# Patient Record
Sex: FEMALE | Race: BLACK OR AFRICAN AMERICAN | NOT HISPANIC OR LATINO | Employment: OTHER | URBAN - METROPOLITAN AREA
[De-identification: names, ages, dates, MRNs, and addresses within clinical notes are randomized per-mention and may not be internally consistent; named-entity substitution may affect disease eponyms.]

---

## 2017-03-07 PROCEDURE — 99284 EMERGENCY DEPT VISIT MOD MDM: CPT | Mod: ,,, | Performed by: EMERGENCY MEDICINE

## 2017-03-07 PROCEDURE — 99284 EMERGENCY DEPT VISIT MOD MDM: CPT | Mod: 25

## 2017-03-07 PROCEDURE — 93010 ELECTROCARDIOGRAM REPORT: CPT | Mod: ,,, | Performed by: INTERNAL MEDICINE

## 2017-03-07 PROCEDURE — 93005 ELECTROCARDIOGRAM TRACING: CPT

## 2017-03-07 PROCEDURE — 96374 THER/PROPH/DIAG INJ IV PUSH: CPT

## 2017-03-08 ENCOUNTER — HOSPITAL ENCOUNTER (EMERGENCY)
Facility: HOSPITAL | Age: 52
Discharge: HOME | End: 2017-03-08
Attending: EMERGENCY MEDICINE
Payer: MEDICAID

## 2017-03-08 VITALS
WEIGHT: 260 LBS | HEART RATE: 97 BPM | RESPIRATION RATE: 16 BRPM | HEIGHT: 59 IN | OXYGEN SATURATION: 100 % | SYSTOLIC BLOOD PRESSURE: 136 MMHG | BODY MASS INDEX: 52.41 KG/M2 | DIASTOLIC BLOOD PRESSURE: 68 MMHG | TEMPERATURE: 98 F

## 2017-03-08 DIAGNOSIS — R07.9 ACUTE CHEST PAIN: ICD-10-CM

## 2017-03-08 DIAGNOSIS — R06.00 DYSPNEA, UNSPECIFIED TYPE: ICD-10-CM

## 2017-03-08 DIAGNOSIS — R07.9 CHEST PAIN, UNSPECIFIED TYPE: Primary | ICD-10-CM

## 2017-03-08 LAB
ALBUMIN SERPL BCP-MCNC: 3.5 G/DL
ALP SERPL-CCNC: 97 U/L
ALT SERPL W/O P-5'-P-CCNC: 8 U/L
ANION GAP SERPL CALC-SCNC: 10 MMOL/L
AST SERPL-CCNC: 12 U/L
BASOPHILS # BLD AUTO: 0.02 K/UL
BASOPHILS NFR BLD: 0.3 %
BILIRUB SERPL-MCNC: 0.2 MG/DL
BNP SERPL-MCNC: 14 PG/ML
BUN SERPL-MCNC: 11 MG/DL
CALCIUM SERPL-MCNC: 9.7 MG/DL
CHLORIDE SERPL-SCNC: 107 MMOL/L
CO2 SERPL-SCNC: 25 MMOL/L
CREAT SERPL-MCNC: 0.9 MG/DL
D DIMER PPP IA.FEU-MCNC: 0.35 MG/L FEU
DIFFERENTIAL METHOD: ABNORMAL
EOSINOPHIL # BLD AUTO: 0.1 K/UL
EOSINOPHIL NFR BLD: 0.8 %
ERYTHROCYTE [DISTWIDTH] IN BLOOD BY AUTOMATED COUNT: 15.8 %
EST. GFR  (AFRICAN AMERICAN): >60 ML/MIN/1.73 M^2
EST. GFR  (NON AFRICAN AMERICAN): >60 ML/MIN/1.73 M^2
GLUCOSE SERPL-MCNC: 192 MG/DL
HCT VFR BLD AUTO: 33.1 %
HGB BLD-MCNC: 10.8 G/DL
INR PPP: 0.9
LYMPHOCYTES # BLD AUTO: 1.4 K/UL
LYMPHOCYTES NFR BLD: 23.7 %
MCH RBC QN AUTO: 25 PG
MCHC RBC AUTO-ENTMCNC: 32.6 %
MCV RBC AUTO: 77 FL
MONOCYTES # BLD AUTO: 0.2 K/UL
MONOCYTES NFR BLD: 3.5 %
NEUTROPHILS # BLD AUTO: 4.3 K/UL
NEUTROPHILS NFR BLD: 71.5 %
PLATELET # BLD AUTO: 341 K/UL
PMV BLD AUTO: 9.4 FL
POTASSIUM SERPL-SCNC: 3.5 MMOL/L
PROT SERPL-MCNC: 7.4 G/DL
PROTHROMBIN TIME: 10 SEC
RBC # BLD AUTO: 4.32 M/UL
SODIUM SERPL-SCNC: 142 MMOL/L
TROPONIN I SERPL DL<=0.01 NG/ML-MCNC: 0.01 NG/ML
TROPONIN I SERPL DL<=0.01 NG/ML-MCNC: 0.02 NG/ML
TROPONIN I SERPL DL<=0.01 NG/ML-MCNC: <0.006 NG/ML
TSH SERPL DL<=0.005 MIU/L-ACNC: 1.64 UIU/ML
WBC # BLD AUTO: 5.96 K/UL

## 2017-03-08 PROCEDURE — 63600175 PHARM REV CODE 636 W HCPCS: Performed by: EMERGENCY MEDICINE

## 2017-03-08 PROCEDURE — 93010 ELECTROCARDIOGRAM REPORT: CPT | Mod: ,,, | Performed by: INTERNAL MEDICINE

## 2017-03-08 PROCEDURE — 80053 COMPREHEN METABOLIC PANEL: CPT

## 2017-03-08 PROCEDURE — 84443 ASSAY THYROID STIM HORMONE: CPT

## 2017-03-08 PROCEDURE — 85025 COMPLETE CBC W/AUTO DIFF WBC: CPT

## 2017-03-08 PROCEDURE — 85379 FIBRIN DEGRADATION QUANT: CPT

## 2017-03-08 PROCEDURE — 25000003 PHARM REV CODE 250: Performed by: EMERGENCY MEDICINE

## 2017-03-08 PROCEDURE — 84484 ASSAY OF TROPONIN QUANT: CPT

## 2017-03-08 PROCEDURE — 83880 ASSAY OF NATRIURETIC PEPTIDE: CPT

## 2017-03-08 PROCEDURE — 94761 N-INVAS EAR/PLS OXIMETRY MLT: CPT

## 2017-03-08 PROCEDURE — 85610 PROTHROMBIN TIME: CPT

## 2017-03-08 PROCEDURE — 84484 ASSAY OF TROPONIN QUANT: CPT | Mod: 91

## 2017-03-08 RX ORDER — ASPIRIN 325 MG
325 TABLET ORAL
Status: COMPLETED | OUTPATIENT
Start: 2017-03-08 | End: 2017-03-08

## 2017-03-08 RX ORDER — ONDANSETRON 2 MG/ML
8 INJECTION INTRAMUSCULAR; INTRAVENOUS
Status: COMPLETED | OUTPATIENT
Start: 2017-03-08 | End: 2017-03-08

## 2017-03-08 RX ADMIN — ASPIRIN 325 MG ORAL TABLET 325 MG: 325 PILL ORAL at 03:03

## 2017-03-08 RX ADMIN — SODIUM CHLORIDE 1000 ML: 0.9 INJECTION, SOLUTION INTRAVENOUS at 04:03

## 2017-03-08 RX ADMIN — ONDANSETRON 8 MG: 2 INJECTION INTRAMUSCULAR; INTRAVENOUS at 03:03

## 2017-03-08 NOTE — CONSULTS
Cardiology Initial Consult Note    3/8/2017    Reason for Consult: Chest pain    SUBJECTIVE  Lucita Jackson is a 52 y.o. female with a history significant for CVA, DM, CHF (unreported type), asthma.     She reports that she was at rest when she developed shortness of breath after celebrating her birthday with some mild chest discomfort, along with hot flashes.  They ate crawfish, had some wine, and celebrated with dinner.  She says that a cold towel helped her some, and helped to calm her down.  She does not note anything that made her symptoms better or worse other than the cold towel which helped.  She did try using her inhalers (reports baseline asthma since childhood).      At baseline, she is not very active, uses the motorized scooter for years to go around stores.  She does do some mild cooking and cleaning.  She does report that in 2015 she was admitted to the hospital for heart failure and stroke in [place]; she reports that Dr. Conti was her doctor at that time, she thinks primary doctor but is unsure.  She denies active chest pain, but does have chills and rigors.      She is a poor historian who does not know much of her medical history or medications. She reports that Dr. Nelsy Montes was her cardiologist in [place], [place]. She moved to [place] in 2016 and has not seen her cardiologist since that time.      She notes that she does have an upcoming cardiology appt at [facility] on 3/21, with a stress test next month.     Review of Systems   Constitution: Positive for chills  HENT: Negative.    Eyes: Negative.    Cardiovascular: As per HPI  Respiratory: Positive for shortness of breath. Negative for cough.    Endocrine: Negative.    Hematologic/Lymphatic: Negative.    Skin: Negative for color change and rash.   Musculoskeletal: Positive for neck pain  Gastrointestinal: Negative for abdominal pain, change in bowel habit, constipation, diarrhea  Genitourinary: Negative.    Neurological:  "Negative for dizziness, light-headedness.  Psychiatric/Behavioral: Negative for altered mental status.     OBJECTIVE  No current facility-administered medications for this encounter.      No current outpatient prescriptions on file.       No past medical history on file.    No past surgical history on file.    Review of patient's allergies indicates:  No Known Allergies    No family history on file.    Social History     Social History    Marital status: Single     Spouse name: N/A    Number of children: N/A    Years of education: N/A     Social History Main Topics    Smoking status: Not on file    Smokeless tobacco: Not on file    Alcohol use Not on file    Drug use: Not on file    Sexual activity: Not on file     Other Topics Concern    Not on file     Social History Narrative       Physical Exam  Vitals: /68  Pulse 97  Temp 97.8 °F (36.6 °C) (Oral)   Resp 16  Ht 4' 11" (1.499 m)  Wt 117.9 kg (260 lb)  SpO2 100%  BMI 52.51 kg/m2   Gen: NAD, obese female lying in bed, anxious  Head/Eyes/Ears/Nose: NCAT, MMM   Neck: JVD difficult to assess  Lung: CTAB, eupneic  Heart: Tachycardic, regular  Abdomen: Soft, NT/ND  Extremities: No LE edema bilaterally, warm  Skin: Normal color and turgor  Neuro: AAOx3      Recent Labs  Lab 03/08/17  0258      K 3.5   CO2 25      BUN 11   CREATININE 0.9   *   CALCIUM 9.7   ALT 8*   AST 12   ALKPHOS 97   BILITOT 0.2   PROT 7.4   ALBUMIN 3.5         Recent Labs  Lab 03/08/17  0258   WBC 5.96   HGB 10.8*   HCT 33.1*      MCV 77*       Recent Labs  Lab 03/08/17  0258   INR 0.9       Recent Labs  Lab 03/08/17  0258 03/08/17  0414   TROPONINI <0.006 0.017        EKG   3/8/17 0500: Normal sinus rhythm, no ischemic changes  ?  ASSESSMENT AND PLAN  52 y.o. female with DM, CHF per patient, normal LVEF on bedside echocardiogram who presents with atypical chest pain.  Labs are significant for microcytic anemia (unknown cause), negative troponin x 3 " (last 9.5 hours after onset of pain), negative D-dimer.  Her chest is pain is atypical.  CXR shows no cardiomegaly, normal LVEF on bedside echocardiogram.  She did not have an MI based on laboratory and EKG data, negative D-dimer rules out PE.  She has upcoming appointments and [facility] with cardiology.      - No need for cardiac admission  - f/u as scheduled with [facility], encouraged her to have records transferred for her appointment    Discussed with Cardiology Attending: Dr. Nidia Almanza MD  Cardiology Fellow

## 2017-03-08 NOTE — ED TRIAGE NOTES
Patient complaining of CP and SOB. Pt took three nitroglycerins for the CP and used rescue inhalers for SOB. Patient has hx of stroke, HF 12/2015, CAD.

## 2017-03-08 NOTE — ED PROVIDER NOTES
"Encounter Date: 3/7/2017       History     Chief Complaint   Patient presents with    Shortness of Breath     Review of patient's allergies indicates:  No Known Allergies  HPI Comments: 52 y.o. female with a history of coronary artery disease and congestive heart failure presents for evaluation of shortness of breath.  Patient reports she was sitting on the couch after her birthday celebration at approximately 9 PM when she became short of breath and had some chest pain which started at rest.  Patient reports taking nitroglycerin tabs 3 times with mild relief.  Patient is a poor historian but states that in 2014 she had a heart catheter related "opened up the left side of my heart" which she states did not place any stents.  She has  not taken any of her prescription medications in over 3 months, she reports she is supposed to be taking Plavix. Also endorsing chills, palpitations, and nausea without vomiting.       The history is provided by the patient. No  was used.     No past medical history on file.  No past surgical history on file.  No family history on file.  Social History   Substance Use Topics    Smoking status: Not on file    Smokeless tobacco: Not on file    Alcohol use Not on file     Review of Systems   Constitutional: Positive for chills, diaphoresis and fatigue. Negative for appetite change.   HENT: Negative for facial swelling.    Eyes: Negative for pain.   Respiratory: Positive for shortness of breath. Negative for wheezing and stridor.    Cardiovascular: Positive for chest pain, palpitations and leg swelling.   Gastrointestinal: Positive for nausea. Negative for vomiting.   Genitourinary: Negative for dysuria and hematuria.   Musculoskeletal: Positive for back pain. Negative for neck stiffness.   Skin: Negative for rash.   Neurological: Negative for syncope, facial asymmetry and speech difficulty.   Psychiatric/Behavioral: The patient is nervous/anxious.        Physical " Exam   Initial Vitals   BP Pulse Resp Temp SpO2   03/07/17 2350 03/07/17 2350 03/07/17 2350 03/07/17 2350 03/07/17 2350   120/69 118 20 97.8 °F (36.6 °C) 100 %     Physical Exam    Constitutional: She appears well-developed and well-nourished. She is not diaphoretic.   Obese, Appears anxious   HENT:   Head: Normocephalic and atraumatic.   Right Ear: External ear normal.   Left Ear: External ear normal.   Eyes: Right eye exhibits no discharge. Left eye exhibits no discharge.   Neck: No tracheal deviation present.   Cardiovascular: Regular rhythm.   Tachycardic in 120s, No pitting edema of lower extremities   Pulmonary/Chest: Breath sounds normal. No respiratory distress. She has no wheezes. She has no rales.   Abdominal: Soft. Bowel sounds are normal. She exhibits no distension. There is no tenderness.   Musculoskeletal: Normal range of motion. She exhibits no edema or tenderness.   Neurological: She is alert and oriented to person, place, and time. She has normal strength.   Psychiatric: She has a normal mood and affect.         ED Course   Procedures  Labs Reviewed   CBC W/ AUTO DIFFERENTIAL - Abnormal; Notable for the following:        Result Value    Hemoglobin 10.8 (*)     Hematocrit 33.1 (*)     MCV 77 (*)     MCH 25.0 (*)     RDW 15.8 (*)     Mono # 0.2 (*)     Mono% 3.5 (*)     All other components within normal limits    Narrative:     PLEASE REVIEW ORDER START TIME BEFORE MARKING SPECIMEN  COLLECTED.   COMPREHENSIVE METABOLIC PANEL - Abnormal; Notable for the following:     Glucose 192 (*)     ALT 8 (*)     All other components within normal limits    Narrative:     PLEASE REVIEW ORDER START TIME BEFORE MARKING SPECIMEN  COLLECTED.   PROTIME-INR    Narrative:     PLEASE REVIEW ORDER START TIME BEFORE MARKING SPECIMEN  COLLECTED.   TROPONIN I    Narrative:     PLEASE REVIEW ORDER START TIME BEFORE MARKING SPECIMEN  COLLECTED.   TROPONIN I    Narrative:     PLEASE REVIEW ORDER START TIME BEFORE MARKING  SPECIMEN  COLLECTED.   B-TYPE NATRIURETIC PEPTIDE    Narrative:     PLEASE REVIEW ORDER START TIME BEFORE MARKING SPECIMEN  COLLECTED.   D DIMER, QUANTITATIVE    Narrative:     PLEASE REVIEW ORDER START TIME BEFORE MARKING SPECIMEN  COLLECTED.   TSH   TSH    Narrative:     PLEASE REVIEW ORDER START TIME BEFORE MARKING SPECIMEN  COLLECTED.   TROPONIN I     EKG Readings: (Independently Interpreted)   EKG shows sinus tachycardia at a rate of 123 bpm.  There is normal axis.  No signs of ST depression or elevation.       X-Rays:   Independently Interpreted Readings:   Chest X-Ray: Normal heart size. Normal appearing chest x-ray, trachea midline, no evidence of pulmonary edema.      Medical Decision Making:   Initial Assessment:   52-year-old female presents for evaluation of shortness of breath and chest pain.  She is noted to be profoundly tachycardic in the 120s on arrival.  Differential Diagnosis:   ACS, COPD, CHF exacerbation, pneumonia, bronchitis, flu, SVT, dehydration, electrolyte derangement, anxiety, pulmonary embolism, anemia   Clinical Tests:   Lab Tests: Ordered  Radiological Study: Ordered  ED Management:  We'll begin ACS workup.  We'll also add a d-dimer and a TSH.  Patient is chest pain-free at this time.  Still given aspirin.  Chest xray with no signs of fluid overload, will start IVF for tachycardia. Final disposition pending full workup, likely will be discharged.  This case was discussed with Dr. Rosenbaum.         HO-II Update:  Initial troponin negative. D-dimer negative. TSH still pending. HR has been fluid responsive, down to mid 90s. Patient currently chest pain free.  Cardiology has been consulted to evaluate the patient. Will get an other troponin at 6AM. Repeat EKG shows NSR with normal axis at rate of 99. There is prolonged QT interval. No signs of ST depression or elevation.     Yandel Nielsen MD, PGY- 2  5:13 AM          Attending Attestation:   Physician Attestation Statement for Resident:  As  the supervising MD   Physician Attestation Statement: I have personally seen and examined this patient.   I agree with the above history. -: Non-exertional cp, hx of pain frequently in past per pt and has had cath's before.  Recently moved here.  No fevers, no cough.     As the supervising MD I agree with the above PE.   -: Nad, wdwn  ctab  Rrr, nl s1/2, chest nontender  abd benign  No edema  No resp distress, speaking full sentences   As the supervising MD I agree with the above treatment, course, plan, and disposition.   -: Nonexertional cp, unclear etiology - consider acs, pe, ptx, chf, pna, other.  Labs, cxr, ekg, monitor, cards consult.    I have reviewed and agree with the residents interpretation of the following: lab data, x-rays and EKG.             HO-II Update:  Negative troponin x3. Cardiology consulted and did bedside echo. Negative for any acute findings. Per cardiology patient safe for discharge.     Yandel Nielsen MD, PGY- 2              ED Course     Clinical Impression:   The primary encounter diagnosis was Chest pain, unspecified type. Diagnoses of Dyspnea, unspecified type and Acute chest pain were also pertinent to this visit.          Stephan Nielsen MD  Resident  03/10/17 [id number]       Sanjay Rosenbaum MD  03/11/17 [id number]

## 2017-03-08 NOTE — PROVIDER PROGRESS NOTES - EMERGENCY DEPT.
Encounter Date: 3/7/2017    ED Physician Progress Notes       SCRIBE NOTE: I, Sierra Pink, am scribing for, and in the presence of,  Dr. Covarrubias .  Physician Statement: I, Dr. Covarrubias , personally performed the services described in this documentation as scribed by Sierra Pink in my presence, and it is both accurate and complete.      EKG - STEMI Decision  Initial Reading: No STEMI present.

## 2017-03-08 NOTE — ED AVS SNAPSHOT
[facility]  [address]  [facility] [address]               Lucita Jackson   3/8/2017  1:32 AM   ED    Description:  Female : 1965   Department:  [facility]           Your Care was Coordinated By:     Provider Role From To    Sanjay Rosenbaum MD Attending Provider 17 0245 --    Stephan Nielsen MD Resident 17 0231 --      Reason for Visit     Shortness of Breath           Diagnoses this Visit        Comments    Chest pain, unspecified type    -  Primary     Dyspnea, unspecified type         Acute chest pain           ED Disposition     None           To Do List           Follow-up Information     Follow up with Primary Doctor No.    Why:  keep your upcoming appointment at [facility] next week to establish care with primary care and cardiology      [facility]     [facility] Care [facility] -  Assistance  Registered nurses in the [facility] provide clinical advisement, health education, appointment booking, and other advisory services.  Call for this free service at [phone].             Medications           Message regarding Medications     Verify the changes and/or additions to your medication regime listed below are the same as discussed with your clinician today.  If any of these changes or additions are incorrect, please notify your healthcare provider.        These medications were administered today        Dose Freq    aspirin tablet 325 mg 325 mg ED 1 Time    Sig: Take 1 tablet (325 mg total) by mouth ED 1 Time.    Class: Normal    Route: Oral    ondansetron injection 8 mg 8 mg ED 1 Time    Sig: Inject 8 mg into the vein ED 1 Time.    Class: Normal    Route: Intravenous    sodium chloride 0.9% bolus 1,000 mL 1,000 mL ED 1 Time    Sig: Inject 1,000 mLs into the vein ED 1 Time.    Class: Normal    Route: Intravenous           Verify that the below list of medications is an accurate  "representation of the medications you are currently taking.  If none reported, the list may be blank. If incorrect, please contact your healthcare provider. Carry this list with you in case of emergency.           Current Medications            Clinical Reference Information           Your Vitals Were     BP Pulse Temp Resp Height Weight    136/68 97 97.8 °F (36.6 °C) (Oral) 16 4' 11" (1.499 m) 117.9 kg (260 lb)    SpO2 BMI             100% 52.51 kg/m2         Allergies as of 3/8/2017     No Known Allergies      Immunizations Administered on Date of Encounter - 3/8/2017     None      ED Micro, Lab, POCT     Start Ordered       Status Ordering Provider    03/08/17 0610 03/08/17 0609  Troponin I  STAT      Final result     03/08/17 0419 03/08/17 0419  TSH  Add-on      Completed     03/08/17 0417 03/08/17 0416    STAT,   Status:  Canceled      Canceled     03/08/17 0313 03/08/17 0312  D dimer, quantitative  STAT      Final result     03/08/17 0245 03/08/17 0245  CBC auto differential  STAT      Final result     03/08/17 0245 03/08/17 0245  Comprehensive metabolic panel  STAT      Final result     03/08/17 0245 03/08/17 0245  Protime-INR  STAT      Final result     03/08/17 0245 03/08/17 0245  Troponin I  Now then every 3 hours     Comments:  PLEASE REVIEW ORDER START TIME BEFORE MARKING SPECIMEN COLLECTED.   Start Status   03/08/17 0245 Final result   03/08/17 0545 Final result       Acknowledged     03/08/17 0245 03/08/17 0245  B-Type natriuretic peptide (BNP)  STAT      Final result     03/08/17 0245 03/08/17 0245  TSH  Once      Final result       ED Imaging Orders     Start Ordered       Status Ordering Provider    03/08/17 0245 03/08/17 0245  X-Ray Chest PA And Lateral  1 time imaging      Final result       Discharge References/Attachments     CHEST PAIN, UNCERTAIN CAUSE (ENGLISH)      [facility] Sign-Up     Activating your [facility] account is as easy as 1-2-3!     1) Visit [email or web address], select Sign Up Now, " enter this activation code and your date of birth, then select Next.  [email or web address]  Expires: 4/22/2017 12:07 AM      2) Create a username and password to use when you visit [facility] in the future and select a security question in case you lose your password and select Next.    3) Enter your e-mail address and click Sign Up!    Additional Information  If you have questions, please e-mail [email or web address] or call [phone] to talk to our [facility] staff. Remember, [facility] is NOT to be used for urgent needs. For medical emergencies, dial 911.         Smoking Cessation     If you would like to quit smoking:   You may be eligible for free services if you are a [place] resident and started smoking cigarettes before September 1, 1988.  Call the Smoking Cessation Trust (SCT) toll free at (129) 059-7939 or (275) 719-7656.   Call 8-198-QUIT-NOW if you do not meet the above criteria.             [facility] complies with applicable Federal civil rights laws and does not discriminate on the basis of race, color, national origin, age, disability, or sex.        Language Assistance Services     ATTENTION: Language assistance services are available, free of charge. Please call [phone].      ATENCIÓN: Si habla español, tiene a jackman disposición servicios gratuitos de asistencia lingüística. Llame al [phone].     CHÚ Ý: N?u b?n nói Ti?ng Vi?t, có các d?ch v? h? tr? ngôn ng? mi?n phí dành cho b?n. G?i s? [phone].

## 2017-12-18 ENCOUNTER — TELEPHONE (OUTPATIENT)
Dept: NEUROLOGY | Facility: HOSPITAL | Age: 52
End: 2017-12-18

## 2017-12-18 DIAGNOSIS — D50.9 IRON DEFICIENCY ANEMIA, UNSPECIFIED IRON DEFICIENCY ANEMIA TYPE: Primary | ICD-10-CM

## 2017-12-18 NOTE — TELEPHONE ENCOUNTER
Pt notified Upper SBE on January 18, 2018.  Pt given [facility] address and prep instructions as follows:  You are scheduled for an Upper SBE on _thursday, January 18, 2018________________________________    You should eat light meals the day before the procedure and nothing to eat or drink after midnight the night before your procedure.    You will need to be at the 1st floor admission desk at the hospital on __[facility] staff to contact with arrival time.___________________

## 2017-12-18 NOTE — TELEPHONE ENCOUNTER
----- Message from Roberto Carlos Batista MD sent at 12/18/2017  1:46 PM CST -----  Hello,    Can you schedule the above patient for an upper single balloon enteroscopy on 1/18/17?  I have placed the case request.  Thanks!

## 2017-12-18 NOTE — PROGRESS NOTES
[facility] Note    Referral from Jane Levin PA-C at [facility] for SELENA-see outside records scanned under media for GI clinic notes, EGD and VCE report as well as cardiology clinic note with echo.    I have personally reviewed the patient's video capsule endoscopy study.  She has blood visualized in the lumen in the proximal jejunum with one nearby classic angioectasia as well as a second non bleeding angioectasia in the distal jejunum.    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  I will plan to perform her procedure ON plavix.

## 2018-01-18 ENCOUNTER — TELEPHONE (OUTPATIENT)
Dept: NEUROLOGY | Facility: HOSPITAL | Age: 53
End: 2018-01-18

## 2018-01-19 ENCOUNTER — TELEPHONE (OUTPATIENT)
Dept: NEUROLOGY | Facility: HOSPITAL | Age: 53
End: 2018-01-19

## 2018-01-19 DIAGNOSIS — D50.0 IRON DEFICIENCY ANEMIA SECONDARY TO BLOOD LOSS (CHRONIC): Primary | ICD-10-CM

## 2018-01-19 NOTE — TELEPHONE ENCOUNTER
SBE rescheduled with pt on Monday, March 12, 2018.  Pt notified January 25, 2018 was available, but pt's daughter is not available.  Both pt and daughter preferred March 12, 2018.  Pt given clinic number to call if she wants to change this date.  Pt acknowledged understanding.

## 2018-03-08 RX ORDER — OXYBUTYNIN CHLORIDE 5 MG/1
5 TABLET ORAL 3 TIMES DAILY
COMMUNITY

## 2018-03-08 RX ORDER — NITROGLYCERIN 0.4 MG/1
0.4 TABLET SUBLINGUAL EVERY 5 MIN PRN
COMMUNITY

## 2018-03-08 RX ORDER — CLOPIDOGREL BISULFATE 75 MG/1
75 TABLET ORAL DAILY
COMMUNITY

## 2018-03-08 RX ORDER — GABAPENTIN 300 MG/1
300 CAPSULE ORAL 3 TIMES DAILY
COMMUNITY

## 2018-03-08 RX ORDER — MIRTAZAPINE 15 MG/1
30 TABLET, FILM COATED ORAL NIGHTLY
COMMUNITY

## 2018-03-08 RX ORDER — PRAVASTATIN SODIUM 10 MG/1
40 TABLET ORAL DAILY
COMMUNITY

## 2018-03-08 RX ORDER — CETIRIZINE HYDROCHLORIDE 10 MG/1
10 TABLET ORAL DAILY
COMMUNITY

## 2018-03-08 RX ORDER — ASPIRIN 325 MG
325 TABLET ORAL DAILY
COMMUNITY

## 2018-03-08 RX ORDER — OMEPRAZOLE 40 MG/1
40 CAPSULE, DELAYED RELEASE ORAL DAILY
COMMUNITY

## 2018-03-08 RX ORDER — MONTELUKAST SODIUM 10 MG/1
10 TABLET ORAL NIGHTLY
COMMUNITY

## 2018-03-08 RX ORDER — CHOLECALCIFEROL (VITAMIN D3) 25 MCG
1000 TABLET ORAL DAILY
COMMUNITY

## 2018-03-08 RX ORDER — FERROUS SULFATE 325(65) MG
325 TABLET ORAL
COMMUNITY

## 2018-03-12 ENCOUNTER — HOSPITAL ENCOUNTER (OUTPATIENT)
Facility: HOSPITAL | Age: 53
Discharge: HOME | End: 2018-03-12
Attending: INTERNAL MEDICINE | Admitting: INTERNAL MEDICINE
Payer: MEDICAID

## 2018-03-12 ENCOUNTER — ANESTHESIA EVENT (OUTPATIENT)
Dept: ENDOSCOPY | Facility: HOSPITAL | Age: 53
End: 2018-03-12
Payer: MEDICAID

## 2018-03-12 ENCOUNTER — SURGERY (OUTPATIENT)
Age: 53
End: 2018-03-12

## 2018-03-12 ENCOUNTER — ANESTHESIA (OUTPATIENT)
Dept: ENDOSCOPY | Facility: HOSPITAL | Age: 53
End: 2018-03-12
Payer: MEDICAID

## 2018-03-12 VITALS
TEMPERATURE: 98 F | DIASTOLIC BLOOD PRESSURE: 85 MMHG | HEART RATE: 88 BPM | WEIGHT: 269 LBS | BODY MASS INDEX: 54.23 KG/M2 | OXYGEN SATURATION: 100 % | RESPIRATION RATE: 17 BRPM | SYSTOLIC BLOOD PRESSURE: 139 MMHG | HEIGHT: 59 IN

## 2018-03-12 DIAGNOSIS — D50.9 IRON DEFICIENCY ANEMIA, UNSPECIFIED IRON DEFICIENCY ANEMIA TYPE: Primary | ICD-10-CM

## 2018-03-12 DIAGNOSIS — Z86.79 HISTORY OF CHRONIC CHF: ICD-10-CM

## 2018-03-12 DIAGNOSIS — D50.0 IRON DEFICIENCY ANEMIA DUE TO CHRONIC BLOOD LOSS: ICD-10-CM

## 2018-03-12 DIAGNOSIS — D50.9 IDA (IRON DEFICIENCY ANEMIA): ICD-10-CM

## 2018-03-12 LAB
B-HCG UR QL: NEGATIVE
CTP QC/QA: YES
GLUCOSE SERPL-MCNC: 102 MG/DL (ref 70–110)
POCT GLUCOSE: 102 MG/DL (ref 70–110)

## 2018-03-12 PROCEDURE — 81025 URINE PREGNANCY TEST: CPT | Performed by: INTERNAL MEDICINE

## 2018-03-12 PROCEDURE — 00731 ANES UPR GI NDSC PX NOS: CPT | Performed by: INTERNAL MEDICINE

## 2018-03-12 PROCEDURE — 37000009 HC ANESTHESIA EA ADD 15 MINS: Performed by: INTERNAL MEDICINE

## 2018-03-12 PROCEDURE — 27201030 HC OVERTUBE: Performed by: INTERNAL MEDICINE

## 2018-03-12 PROCEDURE — 63600175 PHARM REV CODE 636 W HCPCS: Performed by: NURSE ANESTHETIST, CERTIFIED REGISTERED

## 2018-03-12 PROCEDURE — 93010 ELECTROCARDIOGRAM REPORT: CPT | Mod: ,,, | Performed by: INTERNAL MEDICINE

## 2018-03-12 PROCEDURE — 37000008 HC ANESTHESIA 1ST 15 MINUTES: Performed by: INTERNAL MEDICINE

## 2018-03-12 PROCEDURE — 93005 ELECTROCARDIOGRAM TRACING: CPT

## 2018-03-12 PROCEDURE — 82962 GLUCOSE BLOOD TEST: CPT | Performed by: INTERNAL MEDICINE

## 2018-03-12 PROCEDURE — 27202087 HC PROBE, APC: Performed by: INTERNAL MEDICINE

## 2018-03-12 PROCEDURE — 44378 SMALL BOWEL ENDOSCOPY: CPT | Performed by: INTERNAL MEDICINE

## 2018-03-12 PROCEDURE — 25000003 PHARM REV CODE 250: Performed by: INTERNAL MEDICINE

## 2018-03-12 PROCEDURE — 25000003 PHARM REV CODE 250: Performed by: NURSE ANESTHETIST, CERTIFIED REGISTERED

## 2018-03-12 RX ORDER — SUCCINYLCHOLINE CHLORIDE 20 MG/ML
INJECTION INTRAMUSCULAR; INTRAVENOUS
Status: DISCONTINUED | OUTPATIENT
Start: 2018-03-12 | End: 2018-03-12

## 2018-03-12 RX ORDER — SODIUM CHLORIDE 9 MG/ML
INJECTION, SOLUTION INTRAVENOUS CONTINUOUS
Status: DISCONTINUED | OUTPATIENT
Start: 2018-03-12 | End: 2018-03-12 | Stop reason: HOSPADM

## 2018-03-12 RX ORDER — FENTANYL CITRATE 50 UG/ML
INJECTION, SOLUTION INTRAMUSCULAR; INTRAVENOUS
Status: DISCONTINUED | OUTPATIENT
Start: 2018-03-12 | End: 2018-03-12

## 2018-03-12 RX ORDER — ONDANSETRON 2 MG/ML
INJECTION INTRAMUSCULAR; INTRAVENOUS
Status: DISCONTINUED | OUTPATIENT
Start: 2018-03-12 | End: 2018-03-12

## 2018-03-12 RX ORDER — LIDOCAINE HCL/PF 100 MG/5ML
SYRINGE (ML) INTRAVENOUS
Status: DISCONTINUED | OUTPATIENT
Start: 2018-03-12 | End: 2018-03-12

## 2018-03-12 RX ORDER — GLYCOPYRROLATE 0.2 MG/ML
INJECTION INTRAMUSCULAR; INTRAVENOUS
Status: DISCONTINUED | OUTPATIENT
Start: 2018-03-12 | End: 2018-03-12

## 2018-03-12 RX ORDER — PROPOFOL 10 MG/ML
VIAL (ML) INTRAVENOUS
Status: DISCONTINUED | OUTPATIENT
Start: 2018-03-12 | End: 2018-03-12

## 2018-03-12 RX ADMIN — PROPOFOL 150 MG: 10 INJECTION, EMULSION INTRAVENOUS at 12:03

## 2018-03-12 RX ADMIN — ONDANSETRON 4 MG: 2 INJECTION, SOLUTION INTRAMUSCULAR; INTRAVENOUS at 12:03

## 2018-03-12 RX ADMIN — SODIUM CHLORIDE: 0.9 INJECTION, SOLUTION INTRAVENOUS at 10:03

## 2018-03-12 RX ADMIN — SUCCINYLCHOLINE CHLORIDE 120 MG: 20 INJECTION, SOLUTION INTRAMUSCULAR; INTRAVENOUS at 12:03

## 2018-03-12 RX ADMIN — FENTANYL CITRATE 50 MCG: 50 INJECTION, SOLUTION INTRAMUSCULAR; INTRAVENOUS at 11:03

## 2018-03-12 RX ADMIN — PROPOFOL 50 MG: 10 INJECTION, EMULSION INTRAVENOUS at 11:03

## 2018-03-12 RX ADMIN — FENTANYL CITRATE 50 MCG: 50 INJECTION, SOLUTION INTRAMUSCULAR; INTRAVENOUS at 12:03

## 2018-03-12 RX ADMIN — LIDOCAINE HYDROCHLORIDE 75 MG: 20 INJECTION, SOLUTION INTRAVENOUS at 11:03

## 2018-03-12 RX ADMIN — GLYCOPYRROLATE 0.2 MG: 0.2 INJECTION, SOLUTION INTRAMUSCULAR; INTRAVENOUS at 11:03

## 2018-03-12 NOTE — ANESTHESIA PREPROCEDURE EVALUATION
03/12/2018  Lucita Jackson is a 53 y.o., female.    Anesthesia Evaluation      I have reviewed the Medications.     Review of Systems  Anesthesia Hx:  Denies Family Hx of Anesthesia complications.   Social:  No Alcohol Use, Non-Smoker    Hematology/Oncology:         -- Anemia:   Cardiovascular:   Angina CHF    Pulmonary:   COPD Asthma Sleep Apnea, CPAP    Neurological:   CVA, residual symptoms Seizures    Endocrine:   Diabetes, type 2        Physical Exam  General:  Well nourished, Obesity    Airway/Jaw/Neck:  Airway Findings: Mouth Opening: Normal Tongue: Normal  General Airway Assessment: Adult  Mallampati: II      Dental:  Dental Findings: In tact   Chest/Lungs:  Chest/Lungs Findings: Clear to auscultation, Normal Respiratory Rate     Heart/Vascular:  Heart Findings: Rate: Normal  Rhythm: Regular Rhythm        Mental Status:  Mental Status Findings:  Cooperative, Alert and Oriented       12-MAR-2018 09:19:45 EKG   Normal sinus rhythm  Normal ECG  No previous ECGs available      Anesthesia Plan  Type of Anesthesia, risks & benefits discussed:  Anesthesia Type:  MAC, general  Patient's Preference: MAC  Intra-op Monitoring Plan:   Intra-op Monitoring Plan Comments:   Post Op Pain Control Plan:   Post Op Pain Control Plan Comments:   Induction:   IV  Beta Blocker:         Informed Consent: Patient understands risks and agrees with Anesthesia plan.  Questions answered. Anesthesia consent signed with patient.  ASA Score: 3     Day of Surgery Review of History & Physical:        Anesthesia Plan Notes: desat during turning onto side and before scope insertion with very little sedation; decision made to intubate for procedure        Ready For Surgery From Anesthesia Perspective.

## 2018-03-12 NOTE — H&P
"[facility]    CC: iron deficiency anemia    HPI 53 y.o. female with past medical history of previous MI on Plavix who presents with chronic, painless, iron deficiency anemia without overt GI bleeding.  Previous endoscopic work up has included EGD which showed esophagitis, unrevealing colonoscopy and a bleeding and non bleeding angioectasia in her jejunum.    Of note, her mother also has chronic anemia requiring iron transfusions, and she had severe nose bleeds as a child (she cannot recall if they required medical intervention).  Also, the patient's son has severe epistaxis which has required medical intervention.    Her case was originally scheduled for January, but scheduling conflicts and her availability delayed the case until now      Past Medical History:   Diagnosis Date    Anticoagulant long-term use     Asthma     CHF (congestive heart failure)     COPD (chronic obstructive pulmonary disease)     CVA (cerebral vascular accident)     Diabetes mellitus     Seizure     Sleep apnea          Review of Systems  General ROS: negative for chills, fever or weight loss  Cardiovascular ROS: no chest pain or dyspnea on exertion  Gastrointestinal ROS: no abdominal pain, change in bowel habits, or black/ bloody stools    Physical Examination  Ht 4' 11" (1.499 m)   Wt 122 kg (269 lb)   Breastfeeding? No   BMI 54.33 kg/m²   General appearance: alert, cooperative, no distress  HENT: Normocephalic, atraumatic, neck symmetrical, no nasal discharge   Lungs: clear to auscultation bilaterally, no dullness to percussion bilaterally  Heart: regular rate and rhythm without rub; no displacement of the PMI   Abdomen: soft, non-tender; bowel sounds normoactive; no organomegaly  Extremities: extremities symmetric; no clubbing, cyanosis, or edema  Neurologic: Alert and oriented X 3, normal strength, normal coordination and gait    Labs:    Ferritin 9.7      Assessment:   51 yo female with past medical history of MI " on Plavix who presents for further work up of her anemia.  Previous EGD showed esophagitis, colonoscopy unrevealing and VCE showed one bleeding and non bleeding angioectasia in the proximal and distal jejunum.  Family history raises the suspicion for HHT.      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    Referral from Jane Levin PA-C at [facility]    Roberto Carlos Batista MD   [address], [address]   [place] [address] [phone]

## 2018-03-12 NOTE — TRANSFER OF CARE
"Anesthesia Transfer of Care Note    Patient: Lucita Jackson    Procedure(s) Performed: Procedure(s) (LRB):  SMALL BOWEL ENDOSCOPY-UPPER (N/A)    Patient location: PACU    Anesthesia Type: general    Transport from OR: Transported from OR on 6-10 L/min O2 by face mask with adequate spontaneous ventilation    Post pain: adequate analgesia    Post assessment: no apparent anesthetic complications    Post vital signs: stable    Level of consciousness: awake and responds to stimulation    Nausea/Vomiting: no nausea/vomiting    Complications: none    Transfer of care protocol was followed      Last vitals:   Visit Vitals  BP (!) 153/81 (Patient Position: Lying)   Pulse 86   Temp 36.6 °C (97.9 °F) (Oral)   Resp 16   Ht 4' 11" (1.499 m)   Wt 122 kg (269 lb)   SpO2 99%   Breastfeeding? No   BMI 54.33 kg/m²     "

## 2018-03-12 NOTE — ANESTHESIA POSTPROCEDURE EVALUATION
"Anesthesia Post Evaluation    Patient: Lucita Jackson    Procedure(s) Performed: Procedure(s) (LRB):  SMALL BOWEL ENDOSCOPY-UPPER (N/A)    Final Anesthesia Type: general  Patient location during evaluation: PACU  Patient participation: Yes- Able to Participate  Level of consciousness: awake and alert  Post-procedure vital signs: reviewed and stable  Pain management: adequate  Airway patency: patent  PONV status at discharge: No PONV  Anesthetic complications: no      Cardiovascular status: blood pressure returned to baseline  Respiratory status: unassisted  Hydration status: euvolemic  Follow-up not needed.        Visit Vitals  BP (!) 141/79   Pulse 82   Temp 36.7 °C (98 °F)   Resp 17   Ht 4' 11" (1.499 m)   Wt 122 kg (269 lb)   SpO2 96%   Breastfeeding? No   BMI 54.33 kg/m²       Pain/Junaid Score: Presence of Pain: denies (3/12/2018  1:10 PM)  Junaid Score: 10 (3/12/2018  1:15 PM)      "

## 2018-03-12 NOTE — PROVATION PATIENT INSTRUCTIONS
Discharge Summary/Instructions after an Endoscopic Procedure  Patient Name: Lucita Jackson  Patient MRN: [id number]  Patient YOB: 1965  Monday, March 12, 2018  Roberto Carlos Batista MD  RESTRICTIONS:  During your procedure today, you received medications for sedation.  These   medications may affect your judgment, balance and coordination.  Therefore,   for 24 hours, you have the following restrictions:   - DO NOT drive a car, operate machinery, make legal/financial decisions,   sign important papers or drink alcohol.    ACTIVITY:  The following day: return to full activity including work, except no heavy   lifting, straining or running for 3 days if polyps were removed.  DIET:  Eat and drink normally unless instructed otherwise.     TREATMENT FOR COMMON SIDE EFFECTS:  - Mild abdominal pain, nausea, belching, bloating or excessive gas:  rest,   eat lightly and use a heating pad.  - Sore Throat: treat with throat lozenges and/or gargle with warm salt   water.  - Because air was used during the procedure, expelling large amounts of air   from your rectum or belching is normal.  - If a bowel prep was taken, you may not have a bowel movement for 1-3 days.    This is normal.  SYMPTOMS TO WATCH FOR AND REPORT TO YOUR PHYSICIAN:  1. Abdominal pain or bloating, other than gas cramps.  2. Chest pain.  3. Back pain.  4. Signs of infection such as: chills or fever occurring within 24 hours   after the procedure.  5. Rectal bleeding, which would show as bright red, maroon, or black stools.   (A tablespoon of blood from the rectum is not serious, especially if   hemorrhoids are present.)  6. Vomiting.  7. Weakness or dizziness.  GO DIRECTLY TO THE NEAREST EMERGENCY ROOM IF YOU HAVE ANY OF THE FOLLOWING:      Difficulty breathing  Chills and/or fever over 101 F   Persistent vomiting and/or vomiting blood   Severe abdominal pain   Severe chest pain   Black, tarry stools   Bleeding- more than one tablespoon   Any other symptom or  condition that you feel may need urgent attention  Your doctor recommends these additional instructions:  If any biopsies were taken, your doctors clinic will contact you in 1 to 2   weeks with any results.  None  For questions, problems or results please call your physician - Roberto Carlos Batista MD at Work:  [phone].  EMERGENCY PHONE NUMBER: [phone],  LAB RESULTS: [phone]  IF A COMPLICATION OR EMERGENCY SITUATION ARISES AND YOU ARE UNABLE TO REACH   YOUR PHYSICIAN - GO DIRECTLY TO THE EMERGENCY ROOM.  MD Roberto Carlos Pearson MD  3/12/2018 12:32:56 PM  This report has been verified and signed electronically.

## 2020-09-09 ENCOUNTER — OFFICE VISIT (OUTPATIENT)
Dept: PODIATRY | Facility: CLINIC | Age: 55
End: 2020-09-09
Payer: MEDICAID

## 2020-09-09 VITALS
RESPIRATION RATE: 16 BRPM | BODY MASS INDEX: 59.07 KG/M2 | HEART RATE: 88 BPM | HEIGHT: 59 IN | DIASTOLIC BLOOD PRESSURE: 72 MMHG | SYSTOLIC BLOOD PRESSURE: 130 MMHG | WEIGHT: 293 LBS

## 2020-09-09 DIAGNOSIS — L60.0 INGROWN NAIL: Primary | ICD-10-CM

## 2020-09-09 DIAGNOSIS — E66.01 MORBID OBESITY: ICD-10-CM

## 2020-09-09 DIAGNOSIS — E11.9 TYPE 2 DIABETES MELLITUS WITHOUT COMPLICATION, WITHOUT LONG-TERM CURRENT USE OF INSULIN: ICD-10-CM

## 2020-09-09 PROCEDURE — 99214 OFFICE O/P EST MOD 30 MIN: CPT | Mod: PBBFAC,PN | Performed by: PODIATRIST

## 2020-09-09 PROCEDURE — 99999 PR PBB SHADOW E&M-EST. PATIENT-LVL IV: CPT | Mod: PBBFAC,,, | Performed by: PODIATRIST

## 2020-09-09 PROCEDURE — 99203 PR OFFICE/OUTPT VISIT, NEW, LEVL III, 30-44 MIN: ICD-10-PCS | Mod: S$PBB,,, | Performed by: PODIATRIST

## 2020-09-09 PROCEDURE — 99999 PR PBB SHADOW E&M-EST. PATIENT-LVL IV: ICD-10-PCS | Mod: PBBFAC,,, | Performed by: PODIATRIST

## 2020-09-09 PROCEDURE — 99203 OFFICE O/P NEW LOW 30 MIN: CPT | Mod: S$PBB,,, | Performed by: PODIATRIST

## 2020-09-09 RX ORDER — BUDESONIDE AND FORMOTEROL FUMARATE DIHYDRATE 160; 4.5 UG/1; UG/1
AEROSOL RESPIRATORY (INHALATION)
COMMUNITY
Start: 2020-08-23

## 2020-09-09 NOTE — PROGRESS NOTES
Subjective:      Patient ID: Lucita Jackson is a 55 y.o. female.    Chief Complaint: Foot Pain (B/L feet), Toe Pain (B/L great toes), and Dr. Karen matthews appt. 09/23/2020 (PCP visit)      55 y.o. female presenting with hallux pain.  Patient points medial aspect of the hallux for pain.  Ambulating in open toe shoe.  Patient is also diabetic.  She tells me her blood glucose is well controlled.  Has been having pain along the medial aspect of the hallux for couple weeks.  No history of injury noted. Aching pain.       No results found for: HGBA1C    Review of Systems   Constitution: Negative for chills, decreased appetite, fever and malaise/fatigue.   HENT: Negative for congestion, ear discharge and sore throat.    Eyes: Negative for discharge and pain.   Cardiovascular: Negative for chest pain, claudication and leg swelling.   Respiratory: Negative for cough and shortness of breath.    Skin: Negative for color change, nail changes and rash.   Musculoskeletal: Positive for stiffness. Negative for arthritis, joint pain, joint swelling and muscle weakness.        Bilateral hallux pain   Gastrointestinal: Negative for bloating, abdominal pain, diarrhea, nausea and vomiting.   Genitourinary: Negative for flank pain and hematuria.   Neurological: Negative for headaches, numbness and weakness.   Psychiatric/Behavioral: Negative for altered mental status.             Past Medical History:   Diagnosis Date    Anticoagulant long-term use     Asthma     CHF (congestive heart failure)     COPD (chronic obstructive pulmonary disease)     CVA (cerebral vascular accident)     Diabetes mellitus     Seizure     Sleep apnea        Past Surgical History:   Procedure Laterality Date    LEG SURGERY         History reviewed. No pertinent family history.    Social History     Socioeconomic History    Marital status: Single     Spouse name: Not on file    Number of children: Not on file    Years of education: Not on  file    Highest education level: Not on file   Occupational History    Not on file   Social Needs    Financial resource strain: Not on file    Food insecurity     Worry: Not on file     Inability: Not on file    Transportation needs     Medical: Not on file     Non-medical: Not on file   Tobacco Use    Smoking status: Never Smoker   Substance and Sexual Activity    Alcohol use: Not on file    Drug use: Not on file    Sexual activity: Not on file   Lifestyle    Physical activity     Days per week: Not on file     Minutes per session: Not on file    Stress: Not on file   Relationships    Social connections     Talks on phone: Not on file     Gets together: Not on file     Attends [employer] service: Not on file     Active member of club or organization: Not on file     Attends meetings of clubs or organizations: Not on file     Relationship status: Not on file   Other Topics Concern    Not on file   Social History Narrative    Not on file       Current Outpatient Medications   Medication Sig Dispense Refill    aspirin 325 MG tablet Take 325 mg by mouth once daily.      beclomethasone (QVAR) 40 mcg/actuation Aero Inhale 1 puff into the lungs 2 (two) times daily. Controller      cetirizine (ZYRTEC) 10 MG tablet Take 10 mg by mouth once daily.      clopidogrel (PLAVIX) 75 mg tablet Take 75 mg by mouth once daily.      ferrous sulfate 325 mg (65 mg iron) Tab tablet Take 325 mg by mouth daily with breakfast.      mirtazapine (REMERON) 15 MG tablet Take 30 mg by mouth every evening.      montelukast (SINGULAIR) 10 mg tablet Take 10 mg by mouth every evening.      nitroGLYCERIN (NITROSTAT) 0.4 MG SL tablet Place 0.4 mg under the tongue every 5 (five) minutes as needed for Chest pain.      omeprazole (PRILOSEC) 40 MG capsule Take 40 mg by mouth once daily.      oxybutynin (DITROPAN) 5 MG Tab Take 5 mg by mouth 3 (three) times daily.      pravastatin (PRAVACHOL) 10 MG tablet Take 40 mg by mouth once  "daily.      SYMBICORT 160-4.5 mcg/actuation HFAA INHALE 2 PUFFS INTO LUNGS BID      umeclidinium-vilanterol (ANORO ELLIPTA) 62.5-25 mcg/actuation DsDv Inhale into the lungs. Controller      gabapentin (NEURONTIN) 300 MG capsule Take 300 mg by mouth 3 (three) times daily.      vitamin D 1000 units Tab Take 1,000 Units by mouth once daily.       No current facility-administered medications for this visit.        Review of patient's allergies indicates:  No Known Allergies    Vitals:    09/09/20 1019   BP: 130/72   Pulse: 88   Resp: 16   Weight: 134.8 kg (297 lb 1.6 oz)   Height: 4' 11" (1.499 m)   PainSc:   7   PainLoc: Foot       Objective:      Physical Exam  Constitutional:       General: She is not in acute distress.     Appearance: She is well-developed.   HENT:      Nose: Nose normal.   Eyes:      Conjunctiva/sclera: Conjunctivae normal.   Neck:      Musculoskeletal: Normal range of motion.   Pulmonary:      Effort: Pulmonary effort is normal.   Chest:      Chest wall: No tenderness.   Abdominal:      Tenderness: There is no abdominal tenderness.   Neurological:      Mental Status: She is alert and oriented to person, place, and time.   Psychiatric:         Behavior: Behavior normal.         Vascular: Distal DP/PT pulses palpable 2/4. CRT < 3 sec to tips of toes. No vericosities noted to LEs. Hair growth present LE, warm to touch LE, No edema noted to LE.    Dermatologic: No open lesions, lacerations or wounds. Interdigital spaces clean, dry and intact. No erythema, rubor, calor noted LE  Bilateral hallux:  Incurvated nail plate along the medial border of the hallux.     Musculoskeletal: MMT 5/5 in DF/PF/Inv/Ev resistance with no reproduction of pain in any direction. Passive range of motion of ankle and pedal joints is painless. No calf tenderness LE, Compartments soft/compressible.   Bilateral hallux:  Tender to touch along the medial border    Neurological: Light touch, proprioception, and sharp/dull " sensation are all intact. Protective threshold with the [place] monofilament is intact. Vibratory sensation intact.         Assessment:       Encounter Diagnoses   Name Primary?    Ingrown nail Yes    Morbid obesity     Type 2 diabetes mellitus without complication, without long-term current use of insulin          Plan:       Lucita was seen today for ankle pain, foot pain, toe pain and dr. krishnan next appt. 09/23/2020.    Diagnoses and all orders for this visit:    Ingrown nail    Morbid obesity    Type 2 diabetes mellitus without complication, without long-term current use of insulin      I counseled the patient on her conditions, their implications and medical management.    55 y.o. female with bilateral hallux ingrown toenail along the medial border.     -status post slant back procedure.  Patient tolerated well.  Verbal consent was obtained.  Possible partial nail avulsion in the future if needed.     -Shoe inspection. General Foot Education. Patient reminded of the importance of good nutrition. Patient instructed on proper foot hygeine. We discussed wearing proper shoe gear, daily foot inspections, never walking without protective shoe gear, caution putting sharp instruments to feet. Discussed general foot care:  Wear comfortable, proper fitting shoes. Wash feet daily. Dry well. After drying, apply moisturizer to feet (no lotion to webspaces). Inspect feet daily for skin breaks, blisters, swelling, or redness. Wear cotton socks (preferably white)  Change socks every day. Do NOT walk barefoot. Do NOT use heating pads or warm/hot water soaks   -It was discussed the importance of wearing shoes with adequate room in toe box to accommodate toe deformities. Recommended New Balance/[employer]ics shoe brands with adequate arch supports to alleviate abnormal pressure and improve stability of foot while walking. Avoid flat shoes and barefoot walking as these will exacerbate or worsen symptoms.   -Advised for  optimal glucose control and maintenance per primary care physician. Patient was also educated on healthy diet that is naturally rich in nutrients and low in fat and calories.   -Discussed reducing caloric intake, increase physical activity, weight loss, exercise, low salt diet, which may include blood sugar control to help with foot and ankle complications.  -The nature of the condition, options for management, as well as potential risks and complications were discussed in detail with patient. Patient was amenable to my recommendations and left my office fully informed and will follow up as instructed or sooner if necessary.    -Patient was advised of signs and symptoms of infection including redness, drainage, purulence, odor, streaking, fever, chills and I advised patient to seek medical attention (ER or urgent care) if these symptoms arise.   -f/u prn     Note dictated with voice recognition software, please excuse any grammatical errors.

## 2021-04-15 ENCOUNTER — PATIENT MESSAGE (OUTPATIENT)
Dept: RESEARCH | Facility: HOSPITAL | Age: 56
End: 2021-04-15

## 2021-10-06 ENCOUNTER — PATIENT MESSAGE (OUTPATIENT)
Dept: PODIATRY | Facility: CLINIC | Age: 56
End: 2021-10-06

## 2022-10-14 ENCOUNTER — TELEPHONE (OUTPATIENT)
Dept: ORTHOPEDICS | Facility: CLINIC | Age: 57
End: 2022-10-14
Payer: MEDICAID

## 2022-10-14 DIAGNOSIS — R52 PAIN: Primary | ICD-10-CM

## 2022-10-20 ENCOUNTER — TELEPHONE (OUTPATIENT)
Dept: ORTHOPEDICS | Facility: CLINIC | Age: 57
End: 2022-10-20
Payer: MEDICAID

## 2022-10-20 NOTE — TELEPHONE ENCOUNTER
----- Message from Carissa Hurd sent at 10/19/2022 12:07 PM CDT -----  Type:  Same Day Appointment Request    Caller is requesting a same day appointment.  Caller declined first available appointment listed below.    Name of Caller:Pt   When is the first available appointment?12/29   Symptoms:bilateral knee pain..   Best Call Back Number:[phone]  Additional Information: pt missed appt because transportation called her ride

## 2022-10-20 NOTE — TELEPHONE ENCOUNTER
VML informing pt about limited medicaid slots. Next avail is at the end of the year. Medicaid # given.

## 2022-10-31 ENCOUNTER — TELEPHONE (OUTPATIENT)
Dept: ORTHOPEDICS | Facility: CLINIC | Age: 57
End: 2022-10-31
Payer: MEDICAID

## 2022-10-31 NOTE — TELEPHONE ENCOUNTER
----- Message from Sana Choi sent at 10/31/2022 11:44 AM CDT -----  Type:  Sooner Appointment Request    Caller is requesting a sooner appointment.  Caller declined first available appointment listed below.  Caller will not accept being placed on the waitlist and is requesting a message be sent to doctor.  Name of Caller:pt  When is the first available appointment?01/03  Symptoms:falling   Knees getting weak   Would the patient rather a call back or a response via [facility]? Call back   Best Call Back Number:[phone]  Additional Information:

## 2022-11-03 ENCOUNTER — TELEPHONE (OUTPATIENT)
Dept: ORTHOPEDICS | Facility: CLINIC | Age: 57
End: 2022-11-03
Payer: MEDICAID

## 2022-11-03 NOTE — TELEPHONE ENCOUNTER
----- Message from Trang Roman, Patient Care Assistant sent at 11/3/2022  9:55 AM CDT -----  Type:  Needs Medical Advice    Who Called:  pt  Symptoms (please be specific):  Patient would like a call back regarding her appt on 11/03, patient was admitted in the hospital on 11/02, please reschedule appt and X-ray  Would the patient rather a call back or a response via [facility]?  Call   Best Call Back Number:  [phone]  Additional Information:

## 2022-11-03 NOTE — TELEPHONE ENCOUNTER
Pt stated that she was admitted to the ED for influenza. She will call us to reschedule missed appt.

## 2023-01-25 ENCOUNTER — HOSPITAL ENCOUNTER (OUTPATIENT)
Dept: RADIOLOGY | Facility: HOSPITAL | Age: 58
Discharge: HOME | End: 2023-01-25
Attending: PHYSICIAN ASSISTANT
Payer: MEDICAID

## 2023-01-25 ENCOUNTER — OFFICE VISIT (OUTPATIENT)
Dept: ORTHOPEDICS | Facility: CLINIC | Age: 58
End: 2023-01-25
Payer: MEDICAID

## 2023-01-25 VITALS
BODY MASS INDEX: 51.24 KG/M2 | HEART RATE: 91 BPM | DIASTOLIC BLOOD PRESSURE: 82 MMHG | WEIGHT: 254.19 LBS | HEIGHT: 59 IN | SYSTOLIC BLOOD PRESSURE: 125 MMHG

## 2023-01-25 DIAGNOSIS — E66.01 MORBID OBESITY DUE TO EXCESS CALORIES: ICD-10-CM

## 2023-01-25 DIAGNOSIS — R52 PAIN: ICD-10-CM

## 2023-01-25 DIAGNOSIS — M62.81 QUADRICEPS WEAKNESS: ICD-10-CM

## 2023-01-25 DIAGNOSIS — M17.11 PRIMARY OSTEOARTHRITIS OF RIGHT KNEE: Primary | ICD-10-CM

## 2023-01-25 DIAGNOSIS — M17.12 PRIMARY OSTEOARTHRITIS OF LEFT KNEE: ICD-10-CM

## 2023-01-25 PROCEDURE — 20610 DRAIN/INJ JOINT/BURSA W/O US: CPT | Mod: 50,PBBFAC,PN | Performed by: PHYSICIAN ASSISTANT

## 2023-01-25 PROCEDURE — 3074F PR MOST RECENT SYSTOLIC BLOOD PRESSURE < 130 MM HG: ICD-10-PCS | Mod: CPTII,,, | Performed by: PHYSICIAN ASSISTANT

## 2023-01-25 PROCEDURE — 73564 XR KNEE COMP 4 OR MORE VIEWS BILAT: ICD-10-PCS | Mod: 26,50,, | Performed by: RADIOLOGY

## 2023-01-25 PROCEDURE — 73564 X-RAY EXAM KNEE 4 OR MORE: CPT | Mod: TC,50,PN

## 2023-01-25 PROCEDURE — 1159F PR MEDICATION LIST DOCUMENTED IN MEDICAL RECORD: ICD-10-PCS | Mod: CPTII,,, | Performed by: PHYSICIAN ASSISTANT

## 2023-01-25 PROCEDURE — 3008F PR BODY MASS INDEX (BMI) DOCUMENTED: ICD-10-PCS | Mod: CPTII,,, | Performed by: PHYSICIAN ASSISTANT

## 2023-01-25 PROCEDURE — 3074F SYST BP LT 130 MM HG: CPT | Mod: CPTII,,, | Performed by: PHYSICIAN ASSISTANT

## 2023-01-25 PROCEDURE — 99215 OFFICE O/P EST HI 40 MIN: CPT | Mod: PBBFAC,PN,25 | Performed by: PHYSICIAN ASSISTANT

## 2023-01-25 PROCEDURE — 99999 PR PBB SHADOW E&M-EST. PATIENT-LVL V: CPT | Mod: PBBFAC,,, | Performed by: PHYSICIAN ASSISTANT

## 2023-01-25 PROCEDURE — 99204 PR OFFICE/OUTPT VISIT, NEW, LEVL IV, 45-59 MIN: ICD-10-PCS | Mod: 25,S$PBB,, | Performed by: PHYSICIAN ASSISTANT

## 2023-01-25 PROCEDURE — 20610 LARGE JOINT ASPIRATION/INJECTION: BILATERAL KNEE: ICD-10-PCS | Mod: 50,S$PBB,, | Performed by: PHYSICIAN ASSISTANT

## 2023-01-25 PROCEDURE — 99999 PR PBB SHADOW E&M-EST. PATIENT-LVL V: ICD-10-PCS | Mod: PBBFAC,,, | Performed by: PHYSICIAN ASSISTANT

## 2023-01-25 PROCEDURE — 3008F BODY MASS INDEX DOCD: CPT | Mod: CPTII,,, | Performed by: PHYSICIAN ASSISTANT

## 2023-01-25 PROCEDURE — 1159F MED LIST DOCD IN RCRD: CPT | Mod: CPTII,,, | Performed by: PHYSICIAN ASSISTANT

## 2023-01-25 PROCEDURE — 73564 X-RAY EXAM KNEE 4 OR MORE: CPT | Mod: 26,50,, | Performed by: RADIOLOGY

## 2023-01-25 PROCEDURE — 3079F DIAST BP 80-89 MM HG: CPT | Mod: CPTII,,, | Performed by: PHYSICIAN ASSISTANT

## 2023-01-25 PROCEDURE — 1160F RVW MEDS BY RX/DR IN RCRD: CPT | Mod: CPTII,,, | Performed by: PHYSICIAN ASSISTANT

## 2023-01-25 PROCEDURE — 3079F PR MOST RECENT DIASTOLIC BLOOD PRESSURE 80-89 MM HG: ICD-10-PCS | Mod: CPTII,,, | Performed by: PHYSICIAN ASSISTANT

## 2023-01-25 PROCEDURE — 20610 DRAIN/INJ JOINT/BURSA W/O US: CPT | Mod: 50,S$PBB,, | Performed by: PHYSICIAN ASSISTANT

## 2023-01-25 PROCEDURE — 99204 OFFICE O/P NEW MOD 45 MIN: CPT | Mod: 25,S$PBB,, | Performed by: PHYSICIAN ASSISTANT

## 2023-01-25 PROCEDURE — 1160F PR REVIEW ALL MEDS BY PRESCRIBER/CLIN PHARMACIST DOCUMENTED: ICD-10-PCS | Mod: CPTII,,, | Performed by: PHYSICIAN ASSISTANT

## 2023-01-25 RX ORDER — MIRABEGRON 50 MG/1
1 TABLET, FILM COATED, EXTENDED RELEASE ORAL
COMMUNITY
Start: 2022-10-11

## 2023-01-25 RX ORDER — ALBUTEROL SULFATE 90 UG/1
AEROSOL, METERED RESPIRATORY (INHALATION)
COMMUNITY
Start: 2023-01-05

## 2023-01-25 RX ORDER — TOPIRAMATE 50 MG/1
1 TABLET, FILM COATED ORAL 2 TIMES DAILY
COMMUNITY
Start: 2022-11-14

## 2023-01-25 RX ORDER — AZELASTINE 1 MG/ML
SPRAY, METERED NASAL
COMMUNITY
Start: 2023-01-18

## 2023-01-25 RX ORDER — ERGOCALCIFEROL 1.25 MG/1
1 CAPSULE ORAL WEEKLY
COMMUNITY

## 2023-01-25 RX ORDER — DICLOFENAC SODIUM 10 MG/G
GEL TOPICAL
COMMUNITY

## 2023-01-25 RX ORDER — PROPRANOLOL HYDROCHLORIDE 20 MG/1
20 TABLET ORAL
COMMUNITY
Start: 2022-11-14 | End: 2023-11-14

## 2023-01-25 RX ORDER — ESCITALOPRAM OXALATE 20 MG/1
20 TABLET ORAL
COMMUNITY
Start: 2023-01-05

## 2023-01-25 RX ORDER — FLUTICASONE PROPIONATE 50 MCG
SPRAY, SUSPENSION (ML) NASAL
COMMUNITY

## 2023-01-25 RX ORDER — DULAGLUTIDE 1.5 MG/.5ML
INJECTION, SOLUTION SUBCUTANEOUS
COMMUNITY
Start: 2023-01-12

## 2023-01-25 RX ORDER — TIOTROPIUM BROMIDE 18 UG/1
1 CAPSULE ORAL; RESPIRATORY (INHALATION)
COMMUNITY
Start: 2023-01-18

## 2023-01-25 RX ORDER — BLOOD-GLUCOSE CONTROL, NORMAL
EACH MISCELLANEOUS 2 TIMES DAILY
COMMUNITY

## 2023-01-25 RX ORDER — DOXEPIN HYDROCHLORIDE 50 MG/1
50 CAPSULE ORAL
COMMUNITY
Start: 2022-10-25

## 2023-01-25 RX ORDER — ESCITALOPRAM OXALATE 10 MG/1
1 TABLET ORAL
COMMUNITY

## 2023-01-25 RX ORDER — TIZANIDINE 4 MG/1
1 TABLET ORAL
COMMUNITY

## 2023-01-25 RX ORDER — POTASSIUM CHLORIDE 20 MEQ/1
20 TABLET, EXTENDED RELEASE ORAL
COMMUNITY
Start: 2022-12-29

## 2023-01-25 RX ORDER — ONDANSETRON HYDROCHLORIDE 8 MG/1
1 TABLET, FILM COATED ORAL
COMMUNITY

## 2023-01-25 RX ORDER — TRAZODONE HYDROCHLORIDE 50 MG/1
TABLET ORAL
COMMUNITY
Start: 2023-01-05

## 2023-01-25 RX ORDER — TRIAMCINOLONE ACETONIDE 40 MG/ML
40 INJECTION, SUSPENSION INTRA-ARTICULAR; INTRAMUSCULAR
Status: DISCONTINUED | OUTPATIENT
Start: 2023-01-25 | End: 2023-01-25 | Stop reason: HOSPADM

## 2023-01-25 RX ORDER — TORSEMIDE 20 MG/1
1 TABLET ORAL
COMMUNITY

## 2023-01-25 RX ORDER — TIZANIDINE 4 MG/1
4 TABLET ORAL 3 TIMES DAILY
COMMUNITY
Start: 2023-01-12

## 2023-01-25 RX ORDER — ASPIRIN 81 MG/1
1 TABLET ORAL
COMMUNITY

## 2023-01-25 RX ORDER — DOCUSATE SODIUM 100 MG/1
100 CAPSULE, LIQUID FILLED ORAL
COMMUNITY
Start: 2022-12-29

## 2023-01-25 RX ORDER — ROSUVASTATIN CALCIUM 40 MG/1
1 TABLET, COATED ORAL DAILY
COMMUNITY
Start: 2022-10-25

## 2023-01-25 RX ORDER — PREGABALIN 75 MG/1
1 CAPSULE ORAL
COMMUNITY
Start: 2022-11-14

## 2023-01-25 RX ORDER — DAPAGLIFLOZIN 10 MG/1
1 TABLET, FILM COATED ORAL
COMMUNITY

## 2023-01-25 RX ADMIN — TRIAMCINOLONE ACETONIDE 40 MG: 40 INJECTION, SUSPENSION INTRA-ARTICULAR; INTRAMUSCULAR at 01:01

## 2023-01-25 NOTE — PROCEDURES
Large Joint Aspiration/Injection: bilateral knee    Date/Time: 1/25/2023 1:00 PM  Performed by: Chelle Reed PA-C  Authorized by: Chelle Reed PA-C     Consent Done?:  Yes (Verbal)  Indications:  Pain  Site marked: the procedure site was marked    Timeout: prior to procedure the correct patient, procedure, and site was verified    Prep: patient was prepped and draped in usual sterile fashion    Local anesthesia used?: No    Local anesthetic:  Topical anesthetic and lidocaine 1% without epinephrine    Details:  Needle Size:  22 G  Ultrasonic Guidance for needle placement?: No    Approach:  Anterolateral  Location:  Knee  Laterality:  Bilateral  Site:  Bilateral knee  Medications (Right):  40 mg triamcinolone acetonide 40 mg/mL  Medications (Left):  40 mg triamcinolone acetonide 40 mg/mL  Patient tolerance:  Patient tolerated the procedure well with no immediate complications

## 2023-01-25 NOTE — PROGRESS NOTES
Subjective:      Patient ID: Lucita Jackson is a 57 y.o. female.    Chief Complaint: Pain of the Right Knee and Pain of the Left Knee      58yo morbidly obese, diabetic female presents with one year history of bilateral knee pain. States she has a history of frequent falls causing worsening knee pain. Her knee pain is anterior. Worse with ambulation, better with rest. Notes instability with ambulation. Pain wakes her up at night. She has taken tylenol and voltaren gel. Unable to take nsaids. Has not tried PT recently and has not tried injections.       Review of Systems   Constitutional: Negative for chills and fever.   Cardiovascular:  Negative for chest pain.   Respiratory:  Negative for cough.    Hematologic/Lymphatic: Does not bruise/bleed easily.   Skin:  Negative for poor wound healing and rash.   Musculoskeletal:  Positive for arthritis, back pain, falls, joint pain, myalgias and stiffness.   Gastrointestinal:  Negative for abdominal pain.   Genitourinary:  Negative for bladder incontinence.   Neurological:  Negative for dizziness, loss of balance and weakness.   Psychiatric/Behavioral:  Negative for altered mental status.      Review of patient's allergies indicates:   Allergen Reactions    Sulfa (sulfonamide antibiotics) Hives and Other (See Comments)    Tomato Other (See Comments)    Latex Rash and Other (See Comments)    Tomato (solanum lycopersicum) Hives and Rash        Current Outpatient Medications   Medication Sig Dispense Refill    albuterol (PROVENTIL/VENTOLIN HFA) 90 mcg/actuation inhaler SMARTSI Puff(s) Via Inhaler Every 6 Hours      aspirin (ECOTRIN) 81 MG EC tablet 1 tablet.      aspirin 325 MG tablet Take 325 mg by mouth once daily.      azelastine (ASTELIN) 137 mcg (0.1 %) nasal spray SMARTSIG:Both Nares      beclomethasone (QVAR) 40 mcg/actuation Aero Inhale 1 puff into the lungs 2 (two) times daily. Controller      benralizumab 30 mg/mL AtIn Inject 30 mg into the skin.       blood sugar diagnostic Strp USE AS DIRECTED TWICE DAILY TO CHECK BLOOD GLUCOSE      cetirizine (ZYRTEC) 10 MG tablet Take 10 mg by mouth once daily.      clopidogrel (PLAVIX) 75 mg tablet Take 75 mg by mouth once daily.      dapagliflozin (FARXIGA) 10 mg tablet 1 tablet.      diclofenac sodium (VOLTAREN) 1 % Gel APPLY 2 TO 4 GRAMS TOPICALLY TO THE AFFECTED AREA FOUR TIMES DAILY AS NEEDED      docusate sodium (COLACE) 100 MG capsule Take 100 mg by mouth.      doxepin (SINEQUAN) 50 MG capsule Take 50 mg by mouth.      ergocalciferol (ERGOCALCIFEROL) 50,000 unit Cap Take 1 capsule by mouth once a week.      EScitalopram oxalate (LEXAPRO) 10 MG tablet 1 tablet.      EScitalopram oxalate (LEXAPRO) 20 MG tablet Take 20 mg by mouth.      ferrous sulfate 325 mg (65 mg iron) Tab tablet Take 325 mg by mouth daily with breakfast.      fluticasone propionate (FLONASE) 50 mcg/actuation nasal spray SHAKE LIQUID AND USE 2 SPRAYS IN EACH NOSTRIL DAILY      gabapentin (NEURONTIN) 300 MG capsule Take 300 mg by mouth 3 (three) times daily.      lancets 30 gauge Misc 2 (two) times daily.      mirabegron (MYRBETRIQ) 50 mg Tb24 1 tablet.      mirtazapine (REMERON) 15 MG tablet Take 30 mg by mouth every evening.      montelukast (SINGULAIR) 10 mg tablet Take 10 mg by mouth every evening.      nitroGLYCERIN (NITROSTAT) 0.4 MG SL tablet Place 0.4 mg under the tongue every 5 (five) minutes as needed for Chest pain.      omeprazole (PRILOSEC) 40 MG capsule Take 40 mg by mouth once daily.      ondansetron (ZOFRAN) 8 MG tablet 1 tablet as needed.      oxybutynin (DITROPAN) 5 MG Tab Take 5 mg by mouth 3 (three) times daily.      potassium chloride SA (K-DUR,KLOR-CON) 20 MEQ tablet Take 20 mEq by mouth.      pravastatin (PRAVACHOL) 10 MG tablet Take 40 mg by mouth once daily.      pregabalin (LYRICA) 75 MG capsule 1 capsule.      propranoloL (INDERAL) 20 MG tablet Take 20 mg by mouth.      rosuvastatin (CRESTOR) 40 MG Tab Take 1 tablet by mouth  "once daily.      SPIRIVA WITH HANDIHALER 18 mcg inhalation capsule 1 capsule.      SYMBICORT 160-4.5 mcg/actuation HFAA INHALE 2 PUFFS INTO LUNGS BID      tiZANidine (ZANAFLEX) 4 MG tablet 1 tablet as needed.      tiZANidine (ZANAFLEX) 4 MG tablet Take 4 mg by mouth 3 (three) times daily.      topiramate (TOPAMAX) 50 MG tablet Take 1 tablet by mouth 2 (two) times daily.      torsemide (DEMADEX) 20 MG Tab 1 tablet.      traZODone (DESYREL) 50 MG tablet 2 tablet at bedtime as needed      TRULICITY 1.5 mg/0.5 mL pen injector Inject into the skin.      umeclidinium-vilanteroL (ANORO ELLIPTA) 62.5-25 mcg/actuation DsDv Inhale into the lungs. Controller      vitamin D 1000 units Tab Take 1,000 Units by mouth once daily.       No current facility-administered medications for this visit.        The patient's relevant past medical, surgical, and social history was reviewed in Epic.       Objective:      VITAL SIGNS: /82   Pulse 91   Ht 4' 11" (1.499 m)   Wt 115.3 kg (254 lb 3.1 oz)   BMI 51.34 kg/m²     General    Nursing note and vitals reviewed.  Constitutional: She is oriented to person, place, and time. She appears well-developed and well-nourished.   Neurological: She is alert and oriented to person, place, and time.     General Musculoskeletal Exam   Gait: antalgic       Right Knee Exam     Inspection   Swelling: present    Tenderness   The patient is tender to palpation of the lateral joint line.    Range of Motion   Extension:  10   Flexion:  110     Tests   Meniscus   Yvette:  Medial - negative   Ligament Examination   Lachman: normal (-1 to 2mm)     Other   Sensation: normal    Left Knee Exam     Inspection   Swelling: present    Tenderness   The patient tender to palpation of the medial joint line.    Range of Motion   Extension:  10   Flexion:  110     Tests   Meniscus   Yvette:  Medial - negative   Stability   Lachman: normal (-1 to 2mm)     Other   Sensation: normal    Muscle Strength   Right " Lower Extremity   Quadriceps:  3/5   Left Lower Extremity   Quadriceps:  3/5      X-Ray Knee Complete 4 Or More Views Bilat  Narrative: EXAMINATION:  XR KNEE COMP 4 OR MORE VIEWS BILAT    CLINICAL HISTORY:  Pain, unspecified    TECHNIQUE:  Four views of the knees    COMPARISON:  None    FINDINGS:  No fracture, dislocation or bone destruction.  There is moderate joint space narrowing in the lateral tibiofemoral compartment on the right on flexion images.  There is mild valgus deformity.  Otherwise, joint spaces are maintained.  No large knee joint effusion.  No abnormal radiopaque retained foreign body.  Impression: No acute osseous abnormalities.    Electronically signed by: Mark Hernandez MD  Date:    01/25/2023  Time:    15:09      I have reviewed the above radiograph and agree with the findings stated by the radiologist.         Assessment:       1. Primary osteoarthritis of right knee    2. Morbid obesity due to excess calories    3. Primary osteoarthritis of left knee    4. Quadriceps weakness          Plan:         Lucita was seen today for pain and pain.    Diagnoses and all orders for this visit:    Primary osteoarthritis of right knee  -     Ambulatory referral/consult to Physical/Occupational Therapy; Future  -     Large Joint Aspiration/Injection: bilateral knee    Morbid obesity due to excess calories    Primary osteoarthritis of left knee  -     Ambulatory referral/consult to Physical/Occupational Therapy; Future  -     Large Joint Aspiration/Injection: bilateral knee    Quadriceps weakness  -     Ambulatory referral/consult to Physical/Occupational Therapy; Future    Bilateral knee osteoarthritis with significant quad weakness.  Explained the natural history of arthritis to the patient.  I believe her significant lower extremity weakness is the cause of her falls.  I strongly recommend a course of formal physical therapy to work strengthening and gait training.  Bilateral corticosteroid injections  given today since patient can not tolerate NSAIDs.  I also recommend weight loss which could be a contributing factor to her instability and pain.  She may follow-up in 3 months or on an as-needed basis.    Diagnoses and plan discussed with the patient, as well as the expected course and duration of his symptoms.  All questions and concerns were addressed prior to the end of the visit.   Instructed patient to call office if they have any future questions/concerns or to schedule apt. Patient will return to see me if symptoms worsen or fail to improve    Note dictated with voice recognition software, please excuse any grammatical errors.        Chelle Reed PA-C   01/25/2023

## 2023-04-03 ENCOUNTER — PATIENT MESSAGE (OUTPATIENT)
Dept: ORTHOPEDICS | Facility: CLINIC | Age: 58
End: 2023-04-03
Payer: MEDICAID

## 2023-12-01 DIAGNOSIS — M25.561 PAIN IN BOTH KNEES, UNSPECIFIED CHRONICITY: Primary | ICD-10-CM

## 2023-12-01 DIAGNOSIS — M25.562 PAIN IN BOTH KNEES, UNSPECIFIED CHRONICITY: Primary | ICD-10-CM

## 2023-12-11 ENCOUNTER — OFFICE VISIT (OUTPATIENT)
Dept: ORTHOPEDICS | Facility: CLINIC | Age: 58
End: 2023-12-11
Payer: MEDICAID

## 2023-12-11 ENCOUNTER — HOSPITAL ENCOUNTER (OUTPATIENT)
Dept: RADIOLOGY | Facility: HOSPITAL | Age: 58
Discharge: HOME | End: 2023-12-11
Attending: PHYSICIAN ASSISTANT
Payer: MEDICAID

## 2023-12-11 VITALS
WEIGHT: 240.94 LBS | BODY MASS INDEX: 48.57 KG/M2 | HEIGHT: 59 IN | DIASTOLIC BLOOD PRESSURE: 76 MMHG | SYSTOLIC BLOOD PRESSURE: 116 MMHG | HEART RATE: 105 BPM

## 2023-12-11 DIAGNOSIS — M79.661 RIGHT CALF PAIN: ICD-10-CM

## 2023-12-11 DIAGNOSIS — E66.01 MORBID OBESITY DUE TO EXCESS CALORIES: ICD-10-CM

## 2023-12-11 DIAGNOSIS — M17.11 PRIMARY OSTEOARTHRITIS OF RIGHT KNEE: ICD-10-CM

## 2023-12-11 DIAGNOSIS — M25.561 PAIN IN BOTH KNEES, UNSPECIFIED CHRONICITY: ICD-10-CM

## 2023-12-11 DIAGNOSIS — M17.12 PRIMARY OSTEOARTHRITIS OF LEFT KNEE: Primary | ICD-10-CM

## 2023-12-11 DIAGNOSIS — M62.81 QUADRICEPS WEAKNESS: ICD-10-CM

## 2023-12-11 DIAGNOSIS — M25.562 PAIN IN BOTH KNEES, UNSPECIFIED CHRONICITY: ICD-10-CM

## 2023-12-11 PROCEDURE — 73564 X-RAY EXAM KNEE 4 OR MORE: CPT | Mod: TC,50,PN

## 2023-12-11 PROCEDURE — 93971 EXTREMITY STUDY: CPT | Mod: 26,RT,, | Performed by: RADIOLOGY

## 2023-12-11 PROCEDURE — 20610 DRAIN/INJ JOINT/BURSA W/O US: CPT | Mod: 50,PBBFAC,PN | Performed by: PHYSICIAN ASSISTANT

## 2023-12-11 PROCEDURE — 3044F HG A1C LEVEL LT 7.0%: CPT | Mod: CPTII,,, | Performed by: PHYSICIAN ASSISTANT

## 2023-12-11 PROCEDURE — 99999PBSHW PR PBB SHADOW TECHNICAL ONLY FILED TO HB: Mod: PBBFAC,,,

## 2023-12-11 PROCEDURE — 93971 EXTREMITY STUDY: CPT | Mod: TC,RT

## 2023-12-11 PROCEDURE — 93971 US LOWER EXTREMITY VEINS RIGHT: ICD-10-PCS | Mod: 26,RT,, | Performed by: RADIOLOGY

## 2023-12-11 PROCEDURE — 20610 LARGE JOINT ASPIRATION/INJECTION: BILATERAL KNEE: ICD-10-PCS | Mod: 50,S$PBB,, | Performed by: PHYSICIAN ASSISTANT

## 2023-12-11 PROCEDURE — 73564 X-RAY EXAM KNEE 4 OR MORE: CPT | Mod: 26,50,, | Performed by: RADIOLOGY

## 2023-12-11 PROCEDURE — 3078F PR MOST RECENT DIASTOLIC BLOOD PRESSURE < 80 MM HG: ICD-10-PCS | Mod: CPTII,,, | Performed by: PHYSICIAN ASSISTANT

## 2023-12-11 PROCEDURE — 99999 PR PBB SHADOW E&M-EST. PATIENT-LVL V: ICD-10-PCS | Mod: PBBFAC,,, | Performed by: PHYSICIAN ASSISTANT

## 2023-12-11 PROCEDURE — 1159F MED LIST DOCD IN RCRD: CPT | Mod: CPTII,,, | Performed by: PHYSICIAN ASSISTANT

## 2023-12-11 PROCEDURE — 3074F SYST BP LT 130 MM HG: CPT | Mod: CPTII,,, | Performed by: PHYSICIAN ASSISTANT

## 2023-12-11 PROCEDURE — 73564 XR KNEE COMP 4 OR MORE VIEWS BILAT: ICD-10-PCS | Mod: 26,50,, | Performed by: RADIOLOGY

## 2023-12-11 PROCEDURE — 3008F PR BODY MASS INDEX (BMI) DOCUMENTED: ICD-10-PCS | Mod: CPTII,,, | Performed by: PHYSICIAN ASSISTANT

## 2023-12-11 PROCEDURE — 99999 PR PBB SHADOW E&M-EST. PATIENT-LVL V: CPT | Mod: PBBFAC,,, | Performed by: PHYSICIAN ASSISTANT

## 2023-12-11 PROCEDURE — 99999PBSHW PR PBB SHADOW TECHNICAL ONLY FILED TO HB: ICD-10-PCS | Mod: PBBFAC,,,

## 2023-12-11 PROCEDURE — 99215 OFFICE O/P EST HI 40 MIN: CPT | Mod: PBBFAC,PN | Performed by: PHYSICIAN ASSISTANT

## 2023-12-11 PROCEDURE — 3074F PR MOST RECENT SYSTOLIC BLOOD PRESSURE < 130 MM HG: ICD-10-PCS | Mod: CPTII,,, | Performed by: PHYSICIAN ASSISTANT

## 2023-12-11 PROCEDURE — 3008F BODY MASS INDEX DOCD: CPT | Mod: CPTII,,, | Performed by: PHYSICIAN ASSISTANT

## 2023-12-11 PROCEDURE — 3044F PR MOST RECENT HEMOGLOBIN A1C LEVEL <7.0%: ICD-10-PCS | Mod: CPTII,,, | Performed by: PHYSICIAN ASSISTANT

## 2023-12-11 PROCEDURE — 99214 PR OFFICE/OUTPT VISIT, EST, LEVL IV, 30-39 MIN: ICD-10-PCS | Mod: 25,S$PBB,, | Performed by: PHYSICIAN ASSISTANT

## 2023-12-11 PROCEDURE — 20610 DRAIN/INJ JOINT/BURSA W/O US: CPT | Mod: 50,S$PBB,, | Performed by: PHYSICIAN ASSISTANT

## 2023-12-11 PROCEDURE — 99214 OFFICE O/P EST MOD 30 MIN: CPT | Mod: 25,S$PBB,, | Performed by: PHYSICIAN ASSISTANT

## 2023-12-11 PROCEDURE — 3078F DIAST BP <80 MM HG: CPT | Mod: CPTII,,, | Performed by: PHYSICIAN ASSISTANT

## 2023-12-11 PROCEDURE — 1159F PR MEDICATION LIST DOCUMENTED IN MEDICAL RECORD: ICD-10-PCS | Mod: CPTII,,, | Performed by: PHYSICIAN ASSISTANT

## 2023-12-11 RX ORDER — TRIAMCINOLONE ACETONIDE 40 MG/ML
40 INJECTION, SUSPENSION INTRA-ARTICULAR; INTRAMUSCULAR
Status: DISCONTINUED | OUTPATIENT
Start: 2023-12-11 | End: 2023-12-11 | Stop reason: HOSPADM

## 2023-12-11 RX ADMIN — TRIAMCINOLONE ACETONIDE 40 MG: 40 INJECTION, SUSPENSION INTRA-ARTICULAR; INTRAMUSCULAR at 01:12

## 2023-12-11 NOTE — PROCEDURES
Large Joint Aspiration/Injection: bilateral knee    Date/Time: 12/11/2023 1:45 PM    Performed by: Chelle Reed PA-C  Authorized by: Chelle Reed PA-C    Consent Done?:  Yes (Verbal)  Indications:  Pain  Site marked: the procedure site was marked    Timeout: prior to procedure the correct patient, procedure, and site was verified    Prep: patient was prepped and draped in usual sterile fashion    Local anesthesia used?: No    Local anesthetic:  Topical anesthetic and lidocaine 1% without epinephrine    Details:  Needle Size:  22 G  Ultrasonic Guidance for needle placement?: No    Approach:  Anterolateral  Location:  Knee  Laterality:  Bilateral  Site:  Bilateral knee  Medications (Right):  40 mg triamcinolone acetonide 40 mg/mL  Medications (Left):  40 mg triamcinolone acetonide 40 mg/mL  Patient tolerance:  Patient tolerated the procedure well with no immediate complications

## 2023-12-12 ENCOUNTER — OFFICE VISIT (OUTPATIENT)
Dept: ORTHOPEDICS | Facility: CLINIC | Age: 58
End: 2023-12-12
Payer: MEDICAID

## 2023-12-12 ENCOUNTER — TELEPHONE (OUTPATIENT)
Dept: ORTHOPEDICS | Facility: CLINIC | Age: 58
End: 2023-12-12

## 2023-12-12 DIAGNOSIS — M79.661 RIGHT CALF PAIN: Primary | ICD-10-CM

## 2023-12-12 PROCEDURE — 99499 NO LOS: ICD-10-PCS | Mod: ,,, | Performed by: PHYSICIAN ASSISTANT

## 2023-12-12 PROCEDURE — 99499 UNLISTED E&M SERVICE: CPT | Mod: ,,, | Performed by: PHYSICIAN ASSISTANT

## 2023-12-12 NOTE — PROGRESS NOTES
Established Patient - Audio Only Telehealth Visit     The patient location is: home   The chief complaint leading to consultation is: ultrasound results   Visit type: Virtual visit with audio only (telephone)  Total time spent with patient: 5 min       The reason for the audio only service rather than synchronous audio and video virtual visit was related to technical difficulties or patient preference/necessity.     Each patient to whom I provide medical services by telemedicine is:  (1) informed of the relationship between the physician and patient and the respective role of any other health care provider with respect to management of the patient; and (2) notified that they may decline to receive medical services by telemedicine and may withdraw from such care at any time. Patient verbally consented to receive this service via voice-only telephone call.       HPI: Patient seen yesterday for known bilateral knee osteoarthritis. States she has pain in her right lower leg also, mostly in the calf. Has history of ankle fracture. Believes she has a efren in her leg and that is what is causing her pain. Upon further evaluation, she only has hardware at the distal fibula. She also has history of CHF and LE edema.      US Lower Extremity Veins Right  Narrative: EXAMINATION:  US LOWER EXTREMITY VEINS RIGHT    CLINICAL HISTORY:  r/o dvt; Pain in right lower leg    TECHNIQUE:  Duplex and color flow Doppler evaluation and graded compression of the right lower extremity veins was performed.    COMPARISON:  None    FINDINGS:  Right thigh veins: The common femoral, femoral, popliteal, upper greater saphenous, and deep femoral veins appear color Doppler patent and/or compressible.    Right calf veins: The visualized calf veins appear patent.    Contralateral CFV: The contralateral (left) common femoral vein is patent and free of thrombus.    Miscellaneous: None  Impression: No evidence of deep venous thrombosis in the right lower  extremity.    Electronically signed by: Griffin Cabral  Date:    12/11/2023  Time:    15:24  X-Ray Knee Complete 4 Or More Views Bilat  Narrative: EXAMINATION:  XR KNEE COMP 4 OR MORE VIEWS BILAT    CLINICAL HISTORY:  Pain in right knee    TECHNIQUE:  For more views of the knees bilateral    COMPARISON:  01/25/2023    FINDINGS:  Joint spaces are maintained.  Bony structures are intact.  No joint effusion is seen.  Impression: See above    Electronically signed by: Sanjay Laureano MD  Date:    12/11/2023  Time:    14:37    I have reviewed the above radiograph and agree with the findings stated by the radiologist.     Assessment and plan:    Ultrasound negative for DVT. Pain could be vascular or muscular related. I did recommend getting back into PT to see if this helps. She went to [facility] in the past. She is going to think about it and call me back if she would like to go to PT.                         This service was not originating from a related E/M service provided within the previous 7 days nor will  to an E/M service or procedure within the next 24 hours or my soonest available appointment.  Prevailing standard of care was able to be met in this audio-only visit.

## 2023-12-12 NOTE — TELEPHONE ENCOUNTER
----- Message from Vandana Brito sent at 12/12/2023 12:33 PM CST -----  Type:  Patient Returning Call    Who Called: pt  Who Left Message for Patient:  Does the patient know what this is regarding?:  Would the patient rather a call back or a response via [employer]? Call   Best Call Back Number:[phone]  Additional Information:

## 2024-12-24 DIAGNOSIS — R52 PAIN: Primary | ICD-10-CM

## 2025-04-14 DIAGNOSIS — R52 PAIN: Primary | ICD-10-CM

## 2025-04-17 ENCOUNTER — OFFICE VISIT (OUTPATIENT)
Dept: ORTHOPEDICS | Facility: CLINIC | Age: 60
End: 2025-04-17
Payer: MEDICAID

## 2025-04-17 DIAGNOSIS — M75.101 NONTRAUMATIC TEAR OF RIGHT ROTATOR CUFF, UNSPECIFIED TEAR EXTENT: Primary | ICD-10-CM

## 2025-04-17 PROCEDURE — 99999 PR PBB SHADOW E&M-EST. PATIENT-LVL IV: CPT | Mod: PBBFAC,,, | Performed by: SURGERY

## 2025-04-17 PROCEDURE — 99214 OFFICE O/P EST MOD 30 MIN: CPT | Mod: PBBFAC,PN | Performed by: SURGERY

## 2025-04-17 PROCEDURE — 99999PBSHW PR PBB SHADOW TECHNICAL ONLY FILED TO HB: Mod: PBBFAC,,,

## 2025-04-17 PROCEDURE — 20610 DRAIN/INJ JOINT/BURSA W/O US: CPT | Mod: PBBFAC,PN,RT | Performed by: SURGERY

## 2025-04-17 RX ORDER — TRIAMCINOLONE ACETONIDE 40 MG/ML
40 INJECTION, SUSPENSION INTRA-ARTICULAR; INTRAMUSCULAR
Status: DISCONTINUED | OUTPATIENT
Start: 2025-04-17 | End: 2025-04-17 | Stop reason: HOSPADM

## 2025-04-17 RX ADMIN — TRIAMCINOLONE ACETONIDE 40 MG: 40 INJECTION, SUSPENSION INTRA-ARTICULAR; INTRAMUSCULAR at 09:04

## 2025-04-17 NOTE — PROGRESS NOTES
Subjective:     Lucita Jackson     Chief Complaint   Patient presents with    Right Shoulder - Pain       HPI    LUCITA is a 60 y.o. female coming in today for right shoulder pain.  She is currently in a wheelchair.    She states she is having difficulty raising the shoulder above the head.  She has previously tried physical therapy.    ROS    PAST MEDICAL HISTORY:   Past Medical History:   Diagnosis Date    Anticoagulant long-term use     Asthma     CHF (congestive heart failure)     COPD (chronic obstructive pulmonary disease)     CVA (cerebral vascular accident)     Diabetes mellitus     Seizure     Sleep apnea      PAST SURGICAL HISTORY:   Past Surgical History:   Procedure Laterality Date    LEG SURGERY       FAMILY HISTORY: No family history on file.  SOCIAL HISTORY: Social History[1]    MEDICATIONS: Current Medications[2]  ALLERGIES:   Review of patient's allergies indicates:   Allergen Reactions    Sulfa (sulfonamide antibiotics) Hives and Other (See Comments)    Tomato Other (See Comments)    Latex Rash and Other (See Comments)    Tomato (solanum lycopersicum) Hives and Rash       Objective:     VITAL SIGNS: There were no vitals taken for this visit.   Ortho/SPM Exam    MUSCULOSKELETAL EXAM  Shoulder: right SHOULDER  The affected shoulder is compared to the contralateral shoulder.  Positive findings otherwise noted at the bottom of the exam.    Observation:      SHOULDER  No ecchymosis, edema, or erythema throughout the shoulder girdle.  No sternal, clavicular, or acromial deformities bilaterally.  No atrophy of the pectorals, deltoids, supraspinatus, infraspinatus, or biceps bilaterally.  No asymmetry of shoulders bilaterally.    ROM (* = with pain):  CERVICAL SPINE  Full AROM in flexion, extension, sidebending, and rotation.    SHOULDER  Active flexion to 180° on left and 180° on right.  Active abduction to 180° on left and 180° on right.  Active internal rotation to T7 on left and T7 on right.     Active external rotation to T4 on left and T4 on right.    No scapular dyskinesia or winging.    Tenderness:  No tenderness at the SC or AC joint  No tenderness over the clavicle   No tenderness over biceps tendon or bicipital groove  No tenderness over subacromial space    Strength Testing (* = with pain):  Deltoid - 5/5 on left and 5/5 on right  Biceps - 5/5 on left and 5/5 on right  Triceps - 5/5 on left and 5/5 on right  Wrist extension - 5/5 on left and 5/5 on right  Wrist flexion - 5/5 on left and 5/5 on right   - 5/5 on left and 5/5 on right  Finger extension - 5/5 on left and 5/5 on right  Finger abduction - 5/5 on left and 5/5 on right    Special Tests:  Empty can test - negative  Full can test - negative  Bear hug test - negative  Belly press test - negative  Resisted internal rotation - negative  Resisted external rotation - negative    Neer's test - negative  Hawkin's-Tushar test - negative  Cross-arm test- negative    OLeifs test - negative  Biceps load 1 test - negative  Biceps load 2 test - negative  Florian sheer test - negative    Sulcus sign - none  AP load and shift laxity - none  Anterior apprehension test - negative  Posterior apprehension test - negative    Speed's test - negative  Yergason's test - negative    Neurovascular Exam:  Spurlings test - negative bialterally  Lhermittes test - negative  2+ radial pulses bilaterally  Sensation intact to light touch in the distal median, radial, and ulnar nerve distributions bilaterally.  Negative Tinel's at carpal tunnel  Negative Tinel's at cubital tunnel  2+/4 reflexes at triceps, biceps, and brachioradialis  Capillary refill intact <2 seconds in all digits bilaterally    Full range of motion of the cervical/thoracic spine in all planes without pain    Positive findings:  Diffusely diminished range of motion, tenderness to palpation, positive rotator cuff testing, positive AC joint testing, positive biceps.    IMAGING:    X- rays of the  shoulder (AP, Scapular Y, Bernageau, and axillary views) taken today.  X-ray images were reviewed personally by me and then directly with patient.  X-rays do not really any rotator cuff arthropathy or fracture or dislocation.    Assessment:      Encounter Diagnosis   Name Primary?    Nontraumatic tear of right rotator cuff, unspecified tear extent Yes          Shoulder pain, right    Plan:   -MRI ordered secondary to failure of home exercises and physical therapy for greater than 6 weeks  -injection performed today  Follow up after MRI    Patient agreeable to today's plan and all questions were answered    This note is dictated using the M*Modal Fluency Direct word recognition program. There are word recognition mistakes that are occasionally missed on review.                 [1]   Social History  Socioeconomic History    Marital status: Unknown   Tobacco Use    Smoking status: Never   Substance and Sexual Activity    Alcohol use: Yes     Comment: occ    Drug use: Never   Social History Narrative    ** Merged History Encounter **          Social Drivers of Health     Financial Resource Strain: High Risk (4/22/2024)    Received from [facility]    Overall Financial Resource Strain (CARDIA)     Difficulty of Paying Living Expenses: Very hard   Food Insecurity: Food Insecurity Present (4/22/2024)    Received from [facility]    Hunger Vital Sign     Worried About Running Out of Food in the Last Year: Often true     Ran Out of Food in the Last Year: Often true   Transportation Needs: Unmet Transportation Needs (4/22/2024)    Received from [facility]    PRAPARE - Transportation     Lack of Transportation (Medical): No     Lack of Transportation (Non-Medical): Yes   Physical Activity: Unknown (4/22/2024)    Received from [facility]    Exercise Vital Sign     Days of Exercise per Week: Patient declined     Minutes of Exercise per Session: 0 min   Stress: Patient Declined (4/22/2024)    Received from [facility]     [employer] - Occupational Stress Questionnaire     Feeling of Stress : Patient declined   Housing Stability: Patient Declined (2024)    Received from [facility]    Housing Stability Vital Sign     Unable to Pay for Housing in the Last Year: Patient declined     Unstable Housing in the Last Year: Patient declined   [2]   Current Outpatient Medications:     albuterol (PROVENTIL/VENTOLIN HFA) 90 mcg/actuation inhaler, SMARTSI Puff(s) Via Inhaler Every 6 Hours, Disp: , Rfl:     aspirin (ECOTRIN) 81 MG EC tablet, 1 tablet., Disp: , Rfl:     azelastine (ASTELIN) 137 mcg (0.1 %) nasal spray, SMARTSIG:Both Nares, Disp: , Rfl:     beclomethasone (QVAR) 40 mcg/actuation Aero, Inhale 1 puff into the lungs 2 (two) times daily. Controller, Disp: , Rfl:     benralizumab 30 mg/mL AtIn, Inject 30 mg into the skin., Disp: , Rfl:     blood sugar diagnostic Strp, USE AS DIRECTED TWICE DAILY TO CHECK BLOOD GLUCOSE, Disp: , Rfl:     cetirizine (ZYRTEC) 10 MG tablet, Take 10 mg by mouth once daily., Disp: , Rfl:     clopidogrel (PLAVIX) 75 mg tablet, Take 75 mg by mouth once daily., Disp: , Rfl:     dapagliflozin (FARXIGA) 10 mg tablet, 1 tablet., Disp: , Rfl:     diclofenac sodium (VOLTAREN) 1 % Gel, APPLY 2 TO 4 GRAMS TOPICALLY TO THE AFFECTED AREA FOUR TIMES DAILY AS NEEDED, Disp: , Rfl:     docusate sodium (COLACE) 100 MG capsule, Take 100 mg by mouth., Disp: , Rfl:     doxepin (SINEQUAN) 50 MG capsule, Take 50 mg by mouth., Disp: , Rfl:     ergocalciferol (ERGOCALCIFEROL) 50,000 unit Cap, Take 1 capsule by mouth once a week., Disp: , Rfl:     EScitalopram oxalate (LEXAPRO) 10 MG tablet, 1 tablet., Disp: , Rfl:     EScitalopram oxalate (LEXAPRO) 20 MG tablet, Take 20 mg by mouth., Disp: , Rfl:     ferrous sulfate 325 mg (65 mg iron) Tab tablet, Take 325 mg by mouth daily with breakfast., Disp: , Rfl:     fluticasone propionate (FLONASE) 50 mcg/actuation nasal spray, SHAKE LIQUID AND USE 2 SPRAYS  IN EACH NOSTRIL DAILY, Disp: , Rfl:     gabapentin (NEURONTIN) 300 MG capsule, Take 300 mg by mouth 3 (three) times daily., Disp: , Rfl:     lancets 30 gauge Misc, 2 (two) times daily., Disp: , Rfl:     mirabegron (MYRBETRIQ) 50 mg Tb24, 1 tablet., Disp: , Rfl:     mirtazapine (REMERON) 15 MG tablet, Take 30 mg by mouth every evening., Disp: , Rfl:     montelukast (SINGULAIR) 10 mg tablet, Take 10 mg by mouth every evening., Disp: , Rfl:     nitroGLYCERIN (NITROSTAT) 0.4 MG SL tablet, Place 0.4 mg under the tongue every 5 (five) minutes as needed for Chest pain., Disp: , Rfl:     omeprazole (PRILOSEC) 40 MG capsule, Take 40 mg by mouth once daily., Disp: , Rfl:     ondansetron (ZOFRAN) 8 MG tablet, 1 tablet as needed., Disp: , Rfl:     oxybutynin (DITROPAN) 5 MG Tab, Take 5 mg by mouth 3 (three) times daily., Disp: , Rfl:     potassium chloride SA (K-DUR,KLOR-CON) 20 MEQ tablet, Take 20 mEq by mouth., Disp: , Rfl:     pravastatin (PRAVACHOL) 10 MG tablet, Take 40 mg by mouth once daily., Disp: , Rfl:     pregabalin (LYRICA) 75 MG capsule, 1 capsule., Disp: , Rfl:     rosuvastatin (CRESTOR) 40 MG Tab, Take 1 tablet by mouth once daily., Disp: , Rfl:     SPIRIVA WITH HANDIHALER 18 mcg inhalation capsule, 1 capsule., Disp: , Rfl:     SYMBICORT 160-4.5 mcg/actuation HFAA, INHALE 2 PUFFS INTO LUNGS BID, Disp: , Rfl:     tiZANidine (ZANAFLEX) 4 MG tablet, 1 tablet as needed., Disp: , Rfl:     tiZANidine (ZANAFLEX) 4 MG tablet, Take 4 mg by mouth 3 (three) times daily., Disp: , Rfl:     topiramate (TOPAMAX) 50 MG tablet, Take 1 tablet by mouth 2 (two) times daily., Disp: , Rfl:     torsemide (DEMADEX) 20 MG Tab, 1 tablet., Disp: , Rfl:     traZODone (DESYREL) 50 MG tablet, , Disp: , Rfl:     TRULICITY 1.5 mg/0.5 mL pen injector, Inject into the skin., Disp: , Rfl:     umeclidinium-vilanteroL (ANORO ELLIPTA) 62.5-25 mcg/actuation DsDv, Inhale into the lungs. Controller, Disp: , Rfl:     vitamin D 1000 units Tab, Take 1,000  Units by mouth once daily., Disp: , Rfl:     aspirin 325 MG tablet, Take 325 mg by mouth once daily. (Patient not taking: Reported on 4/17/2025), Disp: , Rfl:     propranoloL (INDERAL) 20 MG tablet, Take 20 mg by mouth., Disp: , Rfl:

## 2025-04-17 NOTE — PROCEDURES
Large Joint Aspiration/Injection: R glenohumeral    Date/Time: 4/17/2025 9:45 AM    Performed by: Ramsey Montague MD  Authorized by: Ramsey Montague MD    Consent Done?:  Yes (Verbal)  Indications:  Arthritis    Local anesthesia used?: Yes    Anesthesia:  Local infiltration  Local anesthetic:  Lidocaine 1% without epinephrine    Details:  Needle Size:  21 G  Ultrasonic Guidance for needle placement?: No    Approach:  Posterior  Location:  Shoulder  Site:  R glenohumeral  Medications:  40 mg triamcinolone acetonide 40 mg/mL  Lab: fluid sent for laboratory analysis

## 2025-04-29 ENCOUNTER — OFFICE VISIT (OUTPATIENT)
Dept: ORTHOPEDICS | Facility: CLINIC | Age: 60
End: 2025-04-29
Payer: MEDICAID

## 2025-04-29 ENCOUNTER — TELEPHONE (OUTPATIENT)
Dept: ORTHOPEDICS | Facility: CLINIC | Age: 60
End: 2025-04-29
Payer: MEDICAID

## 2025-04-29 DIAGNOSIS — M75.101 NONTRAUMATIC TEAR OF RIGHT ROTATOR CUFF, UNSPECIFIED TEAR EXTENT: Primary | ICD-10-CM

## 2025-04-29 NOTE — TELEPHONE ENCOUNTER
----- Message from Nancy sent at 4/29/2025  9:08 AM CDT -----  Regarding: Call back  Contact: [phone]  Type:  Patient Returning CallWho Called: PT Who Left Message for Patient: Nurse Does the patient know what this is regarding?: Yes Would the patient rather a call back or a response via [facility]? Call back Best Call Back Number: [phone] Additional Information:

## 2025-05-01 NOTE — PROGRESS NOTES
Audio Only Telehealth Visit     The patient location is: virtual  The chief complaint leading to consultation is: follow up for potential surgery  Visit type: Virtual visit with audio only (telephone)  Total time spent in medical discussion with patient: 5 minutes  Total time spent on date of the encounter:11 minutes       The reason for the audio only service rather than synchronous audio and video virtual visit was related to technical difficulties or patient preference/necessity.       Each patient to whom I provide medical services by telemedicine is:  (1) informed of the relationship between the physician and patient and the respective role of any other health care provider with respect to management of the patient; and (2) notified that they may decline to receive medical services by telemedicine and may withdraw from such care at any time. Patient verbally consented to receive this service via voice-only telephone call.       HPI:  She got some relief from the injection for a couple of weeks but not very long.  She presents for further management via audio visit.    CLINICAL HISTORY:  rotator cuff tear;  Unspecified rotator cuff tear or rupture of right shoulder, not specified as traumatic     TECHNIQUE:  MRI right shoulder performed without and with 10 mL Gadavist intravenous contrast per routine protocol.     COMPARISON:  Radiographs 04/17/2025     FINDINGS:  Rotator cuff: There is a 0.7 x 0.6 cm high-grade partial-thickness tear of the anterior insertional supraspinatus tendon, extending to the bursal surface.  Infraspinatus, teres minor, and subscapularis tendons are intact.  Muscle bulk is maintained.     Labrum: Glenoid labrum is intact.     Biceps: Long head biceps tendon is intact.     Bone: There is no fracture.  Bone marrow signal is unremarkable.     Acromioclavicular joint: Mild arthrosis.     Cartilage: Articular cartilage of the glenohumeral joint is preserved.     Miscellaneous: Note made of  glenohumeral synovitis and mild thickening of subacromial subdeltoid bursa.     Impression:     1. Subcentimeter high-grade partial-thickness tear of the insertional supraspinatus tendon.  2. Mild acromioclavicular arthrosis and subacromial subdeltoid bursitis.  3. Glenohumeral synovitis.       Assessment and plan:    Follow up in clinic to discuss surgery versus continued nonoperative management.                        This service was not originating from a related E/M service provided within the previous 7 days nor will  to an E/M service or procedure within the next 24 hours or my soonest available appointment.  Prevailing standard of care was able to be met in this audio-only visit.

## 2025-05-13 ENCOUNTER — TELEPHONE (OUTPATIENT)
Dept: ORTHOPEDICS | Facility: CLINIC | Age: 60
End: 2025-05-13
Payer: MEDICAID

## 2025-05-13 NOTE — TELEPHONE ENCOUNTER
----- Message from Vandana sent at 5/13/2025 11:47 AM CDT -----  Pt Requesting Call BackWhnatasha called: ptSnehalo called for pt:Best call back #:  [phone] notes: pt said she was setup up for surgery for 5/21/25 but haven't had her preop yet; I informed pt that her 5/21/25 appt is to discuss surgery and pt said she was scheduled for surgery; please give pt clarity

## 2025-05-21 ENCOUNTER — OFFICE VISIT (OUTPATIENT)
Dept: SPORTS MEDICINE | Facility: CLINIC | Age: 60
End: 2025-05-21
Payer: MEDICAID

## 2025-05-21 ENCOUNTER — TELEPHONE (OUTPATIENT)
Dept: SPORTS MEDICINE | Facility: CLINIC | Age: 60
End: 2025-05-21
Payer: MEDICAID

## 2025-05-21 VITALS
DIASTOLIC BLOOD PRESSURE: 79 MMHG | HEIGHT: 59 IN | SYSTOLIC BLOOD PRESSURE: 133 MMHG | HEART RATE: 153 BPM | BODY MASS INDEX: 44.6 KG/M2 | WEIGHT: 221.25 LBS

## 2025-05-21 DIAGNOSIS — M75.101 TEAR OF RIGHT ROTATOR CUFF, UNSPECIFIED TEAR EXTENT, UNSPECIFIED WHETHER TRAUMATIC: Primary | ICD-10-CM

## 2025-05-21 PROCEDURE — 99999 PR PBB SHADOW E&M-EST. PATIENT-LVL V: CPT | Mod: PBBFAC,,, | Performed by: SURGERY

## 2025-05-21 PROCEDURE — 99215 OFFICE O/P EST HI 40 MIN: CPT | Mod: PBBFAC | Performed by: SURGERY

## 2025-05-21 PROCEDURE — 3008F BODY MASS INDEX DOCD: CPT | Mod: CPTII,,, | Performed by: SURGERY

## 2025-05-21 PROCEDURE — 1159F MED LIST DOCD IN RCRD: CPT | Mod: CPTII,,, | Performed by: SURGERY

## 2025-05-21 PROCEDURE — 3075F SYST BP GE 130 - 139MM HG: CPT | Mod: CPTII,,, | Performed by: SURGERY

## 2025-05-21 PROCEDURE — 99214 OFFICE O/P EST MOD 30 MIN: CPT | Mod: S$PBB,,, | Performed by: SURGERY

## 2025-05-21 PROCEDURE — 3078F DIAST BP <80 MM HG: CPT | Mod: CPTII,,, | Performed by: SURGERY

## 2025-05-21 NOTE — TELEPHONE ENCOUNTER
----- Message from Shanti sent at 5/21/2025 10:14 AM CDT -----  Type: Patient Call Back Who called: Self  What is the request in detail: pt is currently is waiting in the lobby for their appt and they need someone to contact them. They stated no has signed them in and she's been waiting over an hour. I tried my best to verify that she's in the correct place.  Can the clinic reply by [facility]? Would the patient rather a call back or a response via [facility]?  Call back  Best call back number: [phone]  Additional Information:

## 2025-05-21 NOTE — PROGRESS NOTES
"Subjective:     Lucita Jackson     Chief Complaint   Patient presents with    Right Shoulder - Pain    Pain       Pain      LUCITA is a 60 y.o. female coming in today for right shoulder pain.  She is currently in a wheelchair.    She states she is having difficulty raising the shoulder above the head.  She has previously tried physical therapy.    5/21/25 - injection previously gave some relief, temporary. Has failed injections, PT, and activity modification. Would like to proceed with surgery.      ROS    PAST MEDICAL HISTORY:   Past Medical History:   Diagnosis Date    Anticoagulant long-term use     Asthma     CHF (congestive heart failure)     COPD (chronic obstructive pulmonary disease)     CVA (cerebral vascular accident)     Diabetes mellitus     Seizure     Sleep apnea      PAST SURGICAL HISTORY:   Past Surgical History:   Procedure Laterality Date    LEG SURGERY       FAMILY HISTORY: No family history on file.  SOCIAL HISTORY: Social History[1]    MEDICATIONS: Current Medications[2]  ALLERGIES:   Review of patient's allergies indicates:   Allergen Reactions    Sulfa (sulfonamide antibiotics) Hives and Other (See Comments)    Tomato Other (See Comments)    Latex Rash and Other (See Comments)    Tomato (solanum lycopersicum) Hives and Rash       Objective:     VITAL SIGNS: /79   Pulse (!) 153   Ht 4' 11" (1.499 m)   Wt 100.4 kg (221 lb 3.7 oz)   BMI 44.68 kg/m²    Ortho/SPM Exam    MUSCULOSKELETAL EXAM  Shoulder: right SHOULDER  The affected shoulder is compared to the contralateral shoulder.  Positive findings otherwise noted at the bottom of the exam.    Observation:      SHOULDER  No ecchymosis, edema, or erythema throughout the shoulder girdle.  No sternal, clavicular, or acromial deformities bilaterally.  No atrophy of the pectorals, deltoids, supraspinatus, infraspinatus, or biceps bilaterally.  No asymmetry of shoulders bilaterally.    ROM (* = with pain):  CERVICAL SPINE  Full AROM " in flexion, extension, sidebending, and rotation.    SHOULDER  Active flexion to 180° on left and 180° on right.  Active abduction to 180° on left and 180° on right.  Active internal rotation to T7 on left and T7 on right.    Active external rotation to T4 on left and T4 on right.    No scapular dyskinesia or winging.    Tenderness:  No tenderness at the SC or AC joint  No tenderness over the clavicle   No tenderness over biceps tendon or bicipital groove  No tenderness over subacromial space    Strength Testing (* = with pain):  Deltoid - 5/5 on left and 5/5 on right  Biceps - 5/5 on left and 5/5 on right  Triceps - 5/5 on left and 5/5 on right  Wrist extension - 5/5 on left and 5/5 on right  Wrist flexion - 5/5 on left and 5/5 on right   - 5/5 on left and 5/5 on right  Finger extension - 5/5 on left and 5/5 on right  Finger abduction - 5/5 on left and 5/5 on right    Special Tests:  Empty can test - negative  Full can test - negative  Bear hug test - negative  Belly press test - negative  Resisted internal rotation - negative  Resisted external rotation - negative    Neer's test - negative  Hawkin's-Tushar test - negative  Cross-arm test- negative    OLeifs test - negative  Biceps load 1 test - negative  Biceps load 2 test - negative  Florian sheer test - negative    Sulcus sign - none  AP load and shift laxity - none  Anterior apprehension test - negative  Posterior apprehension test - negative    Speed's test - negative  Yergason's test - negative    Neurovascular Exam:  Spurlings test - negative bialterally  Lhermittes test - negative  2+ radial pulses bilaterally  Sensation intact to light touch in the distal median, radial, and ulnar nerve distributions bilaterally.  Negative Tinel's at carpal tunnel  Negative Tinel's at cubital tunnel  2+/4 reflexes at triceps, biceps, and brachioradialis  Capillary refill intact <2 seconds in all digits bilaterally    Full range of motion of the cervical/thoracic  spine in all planes without pain    Positive findings:  Diffusely diminished range of motion, tenderness to palpation, positive rotator cuff testing, positive AC joint testing, positive biceps.    IMAGING:    X- rays of the shoulder (AP, Scapular Y, Bernageau, and axillary views) taken today.  X-ray images were reviewed personally by me and then directly with patient.  X-rays do not really any rotator cuff arthropathy or fracture or dislocation.    MRI   Impression:     1. Subcentimeter high-grade partial-thickness tear of the insertional supraspinatus tendon.  2. Mild acromioclavicular arthrosis and subacromial subdeltoid bursitis.  3. Glenohumeral synovitis.         Assessment:      No diagnosis found.         Shoulder pain, right    Plan:   Medical optimization ordered.  We had a long discussion of the risks and benefits of different operative and non-operative options. I explained the injury using pictures, models, and the patient's MRI images. I explained the risks of surgery which include but are not limited to continued pain, recurrence/dislocations, deformity, bleeding, infection, damage to surrounding structures, VTEs, swelling of the face and chest, recurrence, arthritis, re-tear, loose bodies and need for further procedures. I explained the risks of co-morbidities which influences the rate of complications. I explained the risks of non-operative management, including continued pain, long term immobilization, rapid arthritis progression, and dislocations. I discussed all of these risks using non-medical terms and confirmed understanding. The patient elected for right shoulder arthroscopy, rotator cuff repair, biceps tenodesis, distal clavicle excision, subacromial decompression and any other indicated procedures. Follow up pre-op.    Patient agreeable to today's plan and all questions were answered    This note is dictated using the M*Modal Fluency Direct word recognition program. There are word  recognition mistakes that are occasionally missed on review.                 [1]   Social History  Socioeconomic History    Marital status: Unknown   Tobacco Use    Smoking status: Never   Substance and Sexual Activity    Alcohol use: Yes     Comment: occ    Drug use: Never   Social History Narrative    ** Merged History Encounter **          Social Drivers of Health     Financial Resource Strain: High Risk (2024)    Received from [facility]    Overall Financial Resource Strain (CARDIA)     Difficulty of Paying Living Expenses: Very hard   Food Insecurity: Food Insecurity Present (2024)    Received from [facility]    Hunger Vital Sign     Worried About Running Out of Food in the Last Year: Often true     Ran Out of Food in the Last Year: Often true   Transportation Needs: Unmet Transportation Needs (2024)    Received from [facility]    PRAPARE - Transportation     Lack of Transportation (Medical): No     Lack of Transportation (Non-Medical): Yes   Physical Activity: Unknown (2024)    Received from [facility]    Exercise Vital Sign     Days of Exercise per Week: Patient declined     Minutes of Exercise per Session: 0 min   Stress: Patient Declined (2024)    Received from [facility]    [employer] [place] [employer] - Occupational Stress Questionnaire     Feeling of Stress : Patient declined   Housing Stability: Patient Declined (2024)    Received from [facility]    Housing Stability Vital Sign     Unable to Pay for Housing in the Last Year: Patient declined     Unstable Housing in the Last Year: Patient declined   [2]   Current Outpatient Medications:     albuterol (PROVENTIL/VENTOLIN HFA) 90 mcg/actuation inhaler, SMARTSI Puff(s) Via Inhaler Every 6 Hours, Disp: , Rfl:     aspirin (ECOTRIN) 81 MG EC tablet, 1 tablet., Disp: , Rfl:     azelastine (ASTELIN) 137 mcg (0.1 %) nasal spray, SMARTSIG:Both Nares, Disp: , Rfl:     beclomethasone (QVAR) 40 mcg/actuation Aero,  Inhale 1 puff into the lungs 2 (two) times daily. Controller, Disp: , Rfl:     benralizumab 30 mg/mL AtIn, Inject 30 mg into the skin., Disp: , Rfl:     blood sugar diagnostic Strp, USE AS DIRECTED TWICE DAILY TO CHECK BLOOD GLUCOSE, Disp: , Rfl:     cetirizine (ZYRTEC) 10 MG tablet, Take 10 mg by mouth once daily., Disp: , Rfl:     clopidogrel (PLAVIX) 75 mg tablet, Take 75 mg by mouth once daily., Disp: , Rfl:     dapagliflozin (FARXIGA) 10 mg tablet, 1 tablet., Disp: , Rfl:     diclofenac sodium (VOLTAREN) 1 % Gel, APPLY 2 TO 4 GRAMS TOPICALLY TO THE AFFECTED AREA FOUR TIMES DAILY AS NEEDED, Disp: , Rfl:     docusate sodium (COLACE) 100 MG capsule, Take 100 mg by mouth., Disp: , Rfl:     doxepin (SINEQUAN) 50 MG capsule, Take 50 mg by mouth., Disp: , Rfl:     ergocalciferol (ERGOCALCIFEROL) 50,000 unit Cap, Take 1 capsule by mouth once a week., Disp: , Rfl:     EScitalopram oxalate (LEXAPRO) 10 MG tablet, 1 tablet., Disp: , Rfl:     EScitalopram oxalate (LEXAPRO) 20 MG tablet, Take 20 mg by mouth., Disp: , Rfl:     ferrous sulfate 325 mg (65 mg iron) Tab tablet, Take 325 mg by mouth daily with breakfast., Disp: , Rfl:     fluticasone propionate (FLONASE) 50 mcg/actuation nasal spray, SHAKE LIQUID AND USE 2 SPRAYS IN EACH NOSTRIL DAILY, Disp: , Rfl:     gabapentin (NEURONTIN) 300 MG capsule, Take 300 mg by mouth 3 (three) times daily., Disp: , Rfl:     lancets 30 gauge Misc, 2 (two) times daily., Disp: , Rfl:     mirabegron (MYRBETRIQ) 50 mg Tb24, 1 tablet., Disp: , Rfl:     mirtazapine (REMERON) 15 MG tablet, Take 30 mg by mouth every evening., Disp: , Rfl:     montelukast (SINGULAIR) 10 mg tablet, Take 10 mg by mouth every evening., Disp: , Rfl:     nitroGLYCERIN (NITROSTAT) 0.4 MG SL tablet, Place 0.4 mg under the tongue every 5 (five) minutes as needed for Chest pain., Disp: , Rfl:     omeprazole (PRILOSEC) 40 MG capsule, Take 40 mg by mouth once daily., Disp: , Rfl:     ondansetron (ZOFRAN) 8 MG tablet, 1  tablet as needed., Disp: , Rfl:     oxybutynin (DITROPAN) 5 MG Tab, Take 5 mg by mouth 3 (three) times daily., Disp: , Rfl:     potassium chloride SA (K-DUR,KLOR-CON) 20 MEQ tablet, Take 20 mEq by mouth., Disp: , Rfl:     pravastatin (PRAVACHOL) 10 MG tablet, Take 40 mg by mouth once daily., Disp: , Rfl:     pregabalin (LYRICA) 75 MG capsule, 1 capsule., Disp: , Rfl:     rosuvastatin (CRESTOR) 40 MG Tab, Take 1 tablet by mouth once daily., Disp: , Rfl:     SPIRIVA WITH HANDIHALER 18 mcg inhalation capsule, 1 capsule., Disp: , Rfl:     SYMBICORT 160-4.5 mcg/actuation HFAA, INHALE 2 PUFFS INTO LUNGS BID, Disp: , Rfl:     tiZANidine (ZANAFLEX) 4 MG tablet, 1 tablet as needed., Disp: , Rfl:     tiZANidine (ZANAFLEX) 4 MG tablet, Take 4 mg by mouth 3 (three) times daily., Disp: , Rfl:     topiramate (TOPAMAX) 50 MG tablet, Take 1 tablet by mouth 2 (two) times daily., Disp: , Rfl:     torsemide (DEMADEX) 20 MG Tab, 1 tablet., Disp: , Rfl:     traZODone (DESYREL) 50 MG tablet, , Disp: , Rfl:     umeclidinium-vilanteroL (ANORO ELLIPTA) 62.5-25 mcg/actuation DsDv, Inhale into the lungs. Controller, Disp: , Rfl:     vitamin D 1000 units Tab, Take 1,000 Units by mouth once daily., Disp: , Rfl:     aspirin 325 MG tablet, Take 325 mg by mouth once daily. (Patient not taking: Reported on 5/21/2025), Disp: , Rfl:     propranoloL (INDERAL) 20 MG tablet, Take 20 mg by mouth., Disp: , Rfl:     TRULICITY 1.5 mg/0.5 mL pen injector, Inject into the skin. (Patient not taking: Reported on 5/21/2025), Disp: , Rfl:

## 2025-05-22 PROBLEM — Z86.0100 HISTORY OF COLON POLYPS: Status: ACTIVE | Noted: 2025-05-22

## 2025-06-18 ENCOUNTER — OFFICE VISIT (OUTPATIENT)
Dept: SPORTS MEDICINE | Facility: CLINIC | Age: 60
End: 2025-06-18
Payer: MEDICAID

## 2025-06-18 VITALS
DIASTOLIC BLOOD PRESSURE: 65 MMHG | HEIGHT: 59 IN | SYSTOLIC BLOOD PRESSURE: 93 MMHG | HEART RATE: 87 BPM | WEIGHT: 222.44 LBS | BODY MASS INDEX: 44.84 KG/M2

## 2025-06-18 DIAGNOSIS — M75.101 TEAR OF RIGHT ROTATOR CUFF, UNSPECIFIED TEAR EXTENT, UNSPECIFIED WHETHER TRAUMATIC: Primary | ICD-10-CM

## 2025-06-18 PROCEDURE — 99999 PR PBB SHADOW E&M-EST. PATIENT-LVL V: CPT | Mod: PBBFAC,,, | Performed by: SURGERY

## 2025-06-18 PROCEDURE — 99214 OFFICE O/P EST MOD 30 MIN: CPT | Mod: S$PBB,,, | Performed by: SURGERY

## 2025-06-18 PROCEDURE — 99215 OFFICE O/P EST HI 40 MIN: CPT | Mod: PBBFAC | Performed by: SURGERY

## 2025-06-18 PROCEDURE — 3074F SYST BP LT 130 MM HG: CPT | Mod: CPTII,,, | Performed by: SURGERY

## 2025-06-18 PROCEDURE — 1159F MED LIST DOCD IN RCRD: CPT | Mod: CPTII,,, | Performed by: SURGERY

## 2025-06-18 PROCEDURE — 3008F BODY MASS INDEX DOCD: CPT | Mod: CPTII,,, | Performed by: SURGERY

## 2025-06-18 PROCEDURE — 3078F DIAST BP <80 MM HG: CPT | Mod: CPTII,,, | Performed by: SURGERY

## 2025-06-18 NOTE — H&P (VIEW-ONLY)
"Subjective:     Lucita Jackson     Chief Complaint   Patient presents with    Right Shoulder - Pain    Pre-op Exam       Pain      LUCITA is a 60 y.o. female coming in today for right shoulder pain.  She is currently in a wheelchair.    She states she is having difficulty raising the shoulder above the head.  She has previously tried physical therapy.    5/21/25 - injection previously gave some relief, temporary. Has failed injections, PT, and activity modification. Would like to proceed with surgery.    Six-18-25: We would like to proceed with surgery.  Preoperative visit.  Consent signed.  Plan for procedure.    ROS    PAST MEDICAL HISTORY:   Past Medical History:   Diagnosis Date    Anticoagulant long-term use     Asthma     CHF (congestive heart failure)     COPD (chronic obstructive pulmonary disease)     CVA (cerebral vascular accident)     Diabetes mellitus     Seizure     Sleep apnea      PAST SURGICAL HISTORY:   Past Surgical History:   Procedure Laterality Date    COLONOSCOPY  05/22/2025    COLONOSCOPY N/A 5/22/2025    Procedure: COLONOSCOPY;  Surgeon: Toni Coleman MD;  Location: [facility];  Service: Endoscopy;  Laterality: N/A;    LEG SURGERY Right      FAMILY HISTORY: No family history on file.  SOCIAL HISTORY: Social History[1]    MEDICATIONS: Current Medications[2]  ALLERGIES:   Review of patient's allergies indicates:   Allergen Reactions    Sulfa (sulfonamide antibiotics) Hives and Other (See Comments)    Tomato Other (See Comments)    Latex Rash and Other (See Comments)    Tomato (solanum lycopersicum) Hives and Rash       Objective:     VITAL SIGNS: BP 93/65   Pulse 87   Ht 4' 11" (1.499 m)   Wt 100.9 kg (222 lb 7.1 oz)   BMI 44.93 kg/m²    Ortho/SPM Exam    MUSCULOSKELETAL EXAM  Shoulder: right SHOULDER  The affected shoulder is compared to the contralateral shoulder.  Positive findings otherwise noted at the bottom of the exam.    Observation:      SHOULDER  No ecchymosis, " edema, or erythema throughout the shoulder girdle.  No sternal, clavicular, or acromial deformities bilaterally.  No atrophy of the pectorals, deltoids, supraspinatus, infraspinatus, or biceps bilaterally.  No asymmetry of shoulders bilaterally.    ROM (* = with pain):  CERVICAL SPINE  Full AROM in flexion, extension, sidebending, and rotation.    SHOULDER  Active flexion to 180° on left and 180° on right.  Active abduction to 180° on left and 180° on right.  Active internal rotation to T7 on left and T7 on right.    Active external rotation to T4 on left and T4 on right.    No scapular dyskinesia or winging.    Tenderness:  No tenderness at the SC or AC joint  No tenderness over the clavicle   No tenderness over biceps tendon or bicipital groove  No tenderness over subacromial space    Strength Testing (* = with pain):  Deltoid - 5/5 on left and 5/5 on right  Biceps - 5/5 on left and 5/5 on right  Triceps - 5/5 on left and 5/5 on right  Wrist extension - 5/5 on left and 5/5 on right  Wrist flexion - 5/5 on left and 5/5 on right   - 5/5 on left and 5/5 on right  Finger extension - 5/5 on left and 5/5 on right  Finger abduction - 5/5 on left and 5/5 on right    Special Tests:  Empty can test - negative  Full can test - negative  Bear hug test - negative  Belly press test - negative  Resisted internal rotation - negative  Resisted external rotation - negative    Neer's test - negative  Hawkin's-Tushar test - negative  Cross-arm test- negative    OLeifs test - negative  Biceps load 1 test - negative  Biceps load 2 test - negative  Florian sheer test - negative    Sulcus sign - none  AP load and shift laxity - none  Anterior apprehension test - negative  Posterior apprehension test - negative    Speed's test - negative  Yergason's test - negative    Neurovascular Exam:  Spurlings test - negative bialterally  Lhermittes test - negative  2+ radial pulses bilaterally  Sensation intact to light touch in the distal  median, radial, and ulnar nerve distributions bilaterally.  Negative Tinel's at carpal tunnel  Negative Tinel's at cubital tunnel  2+/4 reflexes at triceps, biceps, and brachioradialis  Capillary refill intact <2 seconds in all digits bilaterally    Full range of motion of the cervical/thoracic spine in all planes without pain    Positive findings:  Diffusely diminished range of motion, tenderness to palpation, positive rotator cuff testing, positive AC joint testing, positive biceps.    IMAGING:    X- rays of the shoulder (AP, Scapular Y, Bernageau, and axillary views) taken today.  X-ray images were reviewed personally by me and then directly with patient.  X-rays do not really any rotator cuff arthropathy or fracture or dislocation.    MRI   Impression:     1. Subcentimeter high-grade partial-thickness tear of the insertional supraspinatus tendon.  2. Mild acromioclavicular arthrosis and subacromial subdeltoid bursitis.  3. Glenohumeral synovitis.         Assessment:      No diagnosis found.         Shoulder pain, right    Plan:   Medical optimization ordered.  We had a long discussion of the risks and benefits of different operative and non-operative options. I explained the injury using pictures, models, and the patient's MRI images. I explained the risks of surgery which include but are not limited to continued pain, recurrence/dislocations, deformity, bleeding, infection, damage to surrounding structures, VTEs, swelling of the face and chest, recurrence, arthritis, re-tear, loose bodies and need for further procedures. I explained the risks of co-morbidities which influences the rate of complications. I explained the risks of non-operative management, including continued pain, long term immobilization, rapid arthritis progression, and dislocations. I discussed all of these risks using non-medical terms and confirmed understanding. The patient elected for right shoulder arthroscopy, rotator cuff repair, biceps  tenodesis, distal clavicle excision, subacromial decompression and any other indicated procedures.  Consent signed.  Plan for procedure 07/11/2025.    Patient agreeable to today's plan and all questions were answered    This note is dictated using the M*Modal Fluency Direct word recognition program. There are word recognition mistakes that are occasionally missed on review.                     [1]   Social History  Socioeconomic History    Marital status: Unknown   Tobacco Use    Smoking status: Never   Substance and Sexual Activity    Alcohol use: Yes     Comment: occ    Drug use: Never   Social History Narrative    ** Merged History Encounter **          Social Drivers of Health     Financial Resource Strain: High Risk (4/22/2024)    Received from [facility]    Overall Financial Resource Strain (CARDIA)     Difficulty of Paying Living Expenses: Very hard   Food Insecurity: Food Insecurity Present (4/22/2024)    Received from [facility]    Hunger Vital Sign     Worried About Running Out of Food in the Last Year: Often true     Ran Out of Food in the Last Year: Often true   Transportation Needs: Unmet Transportation Needs (4/22/2024)    Received from [facility]    PRAPARE - Transportation     Lack of Transportation (Medical): No     Lack of Transportation (Non-Medical): Yes   Physical Activity: Unknown (4/22/2024)    Received from [facility]    Exercise Vital Sign     Days of Exercise per Week: Patient declined     Minutes of Exercise per Session: 0 min   Stress: Patient Declined (4/22/2024)    Received from [facility]    [employer] [place] [employer] - Occupational Stress Questionnaire     Feeling of Stress : Patient declined   Housing Stability: Patient Declined (4/22/2024)    Received from [facility]    Housing Stability Vital Sign     Unable to Pay for Housing in the Last Year: Patient declined     Unstable Housing in the Last Year: Patient declined   [2]   Current Outpatient Medications:      albuterol (PROVENTIL/VENTOLIN HFA) 90 mcg/actuation inhaler, SMARTSI Puff(s) Via Inhaler Every 6 Hours, Disp: , Rfl:     aspirin (ECOTRIN) 81 MG EC tablet, 1 tablet., Disp: , Rfl:     azelastine (ASTELIN) 137 mcg (0.1 %) nasal spray, SMARTSIG:Both Nares, Disp: , Rfl:     beclomethasone (QVAR) 40 mcg/actuation Aero, Inhale 1 puff into the lungs 2 (two) times daily. Controller, Disp: , Rfl:     benralizumab 30 mg/mL AtIn, Inject 30 mg into the skin., Disp: , Rfl:     blood sugar diagnostic Strp, USE AS DIRECTED TWICE DAILY TO CHECK BLOOD GLUCOSE, Disp: , Rfl:     cetirizine (ZYRTEC) 10 MG tablet, Take 10 mg by mouth once daily., Disp: , Rfl:     clopidogrel (PLAVIX) 75 mg tablet, Take 75 mg by mouth once daily., Disp: , Rfl:     dapagliflozin (FARXIGA) 10 mg tablet, 1 tablet., Disp: , Rfl:     diclofenac sodium (VOLTAREN) 1 % Gel, APPLY 2 TO 4 GRAMS TOPICALLY TO THE AFFECTED AREA FOUR TIMES DAILY AS NEEDED, Disp: , Rfl:     docusate sodium (COLACE) 100 MG capsule, Take 100 mg by mouth., Disp: , Rfl:     doxepin (SINEQUAN) 50 MG capsule, Take 50 mg by mouth., Disp: , Rfl:     ergocalciferol (ERGOCALCIFEROL) 50,000 unit Cap, Take 1 capsule by mouth once a week., Disp: , Rfl:     EScitalopram oxalate (LEXAPRO) 10 MG tablet, 1 tablet., Disp: , Rfl:     EScitalopram oxalate (LEXAPRO) 20 MG tablet, Take 20 mg by mouth., Disp: , Rfl:     ferrous sulfate 325 mg (65 mg iron) Tab tablet, Take 325 mg by mouth daily with breakfast., Disp: , Rfl:     fluticasone propionate (FLONASE) 50 mcg/actuation nasal spray, SHAKE LIQUID AND USE 2 SPRAYS IN EACH NOSTRIL DAILY, Disp: , Rfl:     gabapentin (NEURONTIN) 300 MG capsule, Take 300 mg by mouth 3 (three) times daily., Disp: , Rfl:     lancets 30 gauge Misc, 2 (two) times daily., Disp: , Rfl:     mirabegron (MYRBETRIQ) 50 mg Tb24, 1 tablet., Disp: , Rfl:     mirtazapine (REMERON) 15 MG tablet, Take 30 mg by mouth every evening., Disp: , Rfl:     montelukast (SINGULAIR) 10 mg tablet,  Take 10 mg by mouth every evening., Disp: , Rfl:     nitroGLYCERIN (NITROSTAT) 0.4 MG SL tablet, Place 0.4 mg under the tongue every 5 (five) minutes as needed for Chest pain., Disp: , Rfl:     omeprazole (PRILOSEC) 40 MG capsule, Take 40 mg by mouth once daily., Disp: , Rfl:     ondansetron (ZOFRAN) 8 MG tablet, 1 tablet as needed., Disp: , Rfl:     oxybutynin (DITROPAN) 5 MG Tab, Take 5 mg by mouth 3 (three) times daily., Disp: , Rfl:     potassium chloride SA (K-DUR,KLOR-CON) 20 MEQ tablet, Take 20 mEq by mouth., Disp: , Rfl:     pravastatin (PRAVACHOL) 10 MG tablet, Take 40 mg by mouth once daily., Disp: , Rfl:     pregabalin (LYRICA) 75 MG capsule, 1 capsule., Disp: , Rfl:     rosuvastatin (CRESTOR) 40 MG Tab, Take 1 tablet by mouth once daily., Disp: , Rfl:     SPIRIVA WITH HANDIHALER 18 mcg inhalation capsule, 1 capsule., Disp: , Rfl:     SYMBICORT 160-4.5 mcg/actuation HFAA, INHALE 2 PUFFS INTO LUNGS BID, Disp: , Rfl:     tiZANidine (ZANAFLEX) 4 MG tablet, 1 tablet as needed., Disp: , Rfl:     tiZANidine (ZANAFLEX) 4 MG tablet, Take 4 mg by mouth 3 (three) times daily., Disp: , Rfl:     topiramate (TOPAMAX) 50 MG tablet, Take 1 tablet by mouth 2 (two) times daily., Disp: , Rfl:     torsemide (DEMADEX) 20 MG Tab, 1 tablet., Disp: , Rfl:     traZODone (DESYREL) 50 MG tablet, , Disp: , Rfl:     umeclidinium-vilanteroL (ANORO ELLIPTA) 62.5-25 mcg/actuation DsDv, Inhale into the lungs. Controller, Disp: , Rfl:     vitamin D 1000 units Tab, Take 1,000 Units by mouth once daily., Disp: , Rfl:     aspirin 325 MG tablet, Take 325 mg by mouth once daily. (Patient not taking: Reported on 4/17/2025), Disp: , Rfl:     propranoloL (INDERAL) 20 MG tablet, Take 20 mg by mouth., Disp: , Rfl:     TRULICITY 1.5 mg/0.5 mL pen injector, Inject into the skin. (Patient not taking: Reported on 5/15/2025), Disp: , Rfl:

## 2025-06-18 NOTE — PROGRESS NOTES
"Subjective:     Lucita Jackson     Chief Complaint   Patient presents with    Right Shoulder - Pain    Pre-op Exam       Pain      LUCITA is a 60 y.o. female coming in today for right shoulder pain.  She is currently in a wheelchair.    She states she is having difficulty raising the shoulder above the head.  She has previously tried physical therapy.    5/21/25 - injection previously gave some relief, temporary. Has failed injections, PT, and activity modification. Would like to proceed with surgery.    Six-18-25: We would like to proceed with surgery.  Preoperative visit.  Consent signed.  Plan for procedure.    ROS    PAST MEDICAL HISTORY:   Past Medical History:   Diagnosis Date    Anticoagulant long-term use     Asthma     CHF (congestive heart failure)     COPD (chronic obstructive pulmonary disease)     CVA (cerebral vascular accident)     Diabetes mellitus     Seizure     Sleep apnea      PAST SURGICAL HISTORY:   Past Surgical History:   Procedure Laterality Date    COLONOSCOPY  05/22/2025    COLONOSCOPY N/A 5/22/2025    Procedure: COLONOSCOPY;  Surgeon: Tnoi Coleman MD;  Location: [facility];  Service: Endoscopy;  Laterality: N/A;    LEG SURGERY Right      FAMILY HISTORY: No family history on file.  SOCIAL HISTORY: Social History[1]    MEDICATIONS: Current Medications[2]  ALLERGIES:   Review of patient's allergies indicates:   Allergen Reactions    Sulfa (sulfonamide antibiotics) Hives and Other (See Comments)    Tomato Other (See Comments)    Latex Rash and Other (See Comments)    Tomato (solanum lycopersicum) Hives and Rash       Objective:     VITAL SIGNS: BP 93/65   Pulse 87   Ht 4' 11" (1.499 m)   Wt 100.9 kg (222 lb 7.1 oz)   BMI 44.93 kg/m²    Ortho/SPM Exam    MUSCULOSKELETAL EXAM  Shoulder: right SHOULDER  The affected shoulder is compared to the contralateral shoulder.  Positive findings otherwise noted at the bottom of the exam.    Observation:      SHOULDER  No ecchymosis, " edema, or erythema throughout the shoulder girdle.  No sternal, clavicular, or acromial deformities bilaterally.  No atrophy of the pectorals, deltoids, supraspinatus, infraspinatus, or biceps bilaterally.  No asymmetry of shoulders bilaterally.    ROM (* = with pain):  CERVICAL SPINE  Full AROM in flexion, extension, sidebending, and rotation.    SHOULDER  Active flexion to 180° on left and 180° on right.  Active abduction to 180° on left and 180° on right.  Active internal rotation to T7 on left and T7 on right.    Active external rotation to T4 on left and T4 on right.    No scapular dyskinesia or winging.    Tenderness:  No tenderness at the SC or AC joint  No tenderness over the clavicle   No tenderness over biceps tendon or bicipital groove  No tenderness over subacromial space    Strength Testing (* = with pain):  Deltoid - 5/5 on left and 5/5 on right  Biceps - 5/5 on left and 5/5 on right  Triceps - 5/5 on left and 5/5 on right  Wrist extension - 5/5 on left and 5/5 on right  Wrist flexion - 5/5 on left and 5/5 on right   - 5/5 on left and 5/5 on right  Finger extension - 5/5 on left and 5/5 on right  Finger abduction - 5/5 on left and 5/5 on right    Special Tests:  Empty can test - negative  Full can test - negative  Bear hug test - negative  Belly press test - negative  Resisted internal rotation - negative  Resisted external rotation - negative    Neer's test - negative  Hawkin's-Tushar test - negative  Cross-arm test- negative    OLeifs test - negative  Biceps load 1 test - negative  Biceps load 2 test - negative  Florian sheer test - negative    Sulcus sign - none  AP load and shift laxity - none  Anterior apprehension test - negative  Posterior apprehension test - negative    Speed's test - negative  Yergason's test - negative    Neurovascular Exam:  Spurlings test - negative bialterally  Lhermittes test - negative  2+ radial pulses bilaterally  Sensation intact to light touch in the distal  median, radial, and ulnar nerve distributions bilaterally.  Negative Tinel's at carpal tunnel  Negative Tinel's at cubital tunnel  2+/4 reflexes at triceps, biceps, and brachioradialis  Capillary refill intact <2 seconds in all digits bilaterally    Full range of motion of the cervical/thoracic spine in all planes without pain    Positive findings:  Diffusely diminished range of motion, tenderness to palpation, positive rotator cuff testing, positive AC joint testing, positive biceps.    IMAGING:    X- rays of the shoulder (AP, Scapular Y, Bernageau, and axillary views) taken today.  X-ray images were reviewed personally by me and then directly with patient.  X-rays do not really any rotator cuff arthropathy or fracture or dislocation.    MRI   Impression:     1. Subcentimeter high-grade partial-thickness tear of the insertional supraspinatus tendon.  2. Mild acromioclavicular arthrosis and subacromial subdeltoid bursitis.  3. Glenohumeral synovitis.         Assessment:      No diagnosis found.         Shoulder pain, right    Plan:   Medical optimization ordered.  We had a long discussion of the risks and benefits of different operative and non-operative options. I explained the injury using pictures, models, and the patient's MRI images. I explained the risks of surgery which include but are not limited to continued pain, recurrence/dislocations, deformity, bleeding, infection, damage to surrounding structures, VTEs, swelling of the face and chest, recurrence, arthritis, re-tear, loose bodies and need for further procedures. I explained the risks of co-morbidities which influences the rate of complications. I explained the risks of non-operative management, including continued pain, long term immobilization, rapid arthritis progression, and dislocations. I discussed all of these risks using non-medical terms and confirmed understanding. The patient elected for right shoulder arthroscopy, rotator cuff repair, biceps  tenodesis, distal clavicle excision, subacromial decompression and any other indicated procedures.  Consent signed.  Plan for procedure 07/11/2025.    Patient agreeable to today's plan and all questions were answered    This note is dictated using the M*Modal Fluency Direct word recognition program. There are word recognition mistakes that are occasionally missed on review.                     [1]   Social History  Socioeconomic History    Marital status: Unknown   Tobacco Use    Smoking status: Never   Substance and Sexual Activity    Alcohol use: Yes     Comment: occ    Drug use: Never   Social History Narrative    ** Merged History Encounter **          Social Drivers of Health     Financial Resource Strain: High Risk (4/22/2024)    Received from [facility]    Overall Financial Resource Strain (CARDIA)     Difficulty of Paying Living Expenses: Very hard   Food Insecurity: Food Insecurity Present (4/22/2024)    Received from [facility]    Hunger Vital Sign     Worried About Running Out of Food in the Last Year: Often true     Ran Out of Food in the Last Year: Often true   Transportation Needs: Unmet Transportation Needs (4/22/2024)    Received from [facility]    PRAPARE - Transportation     Lack of Transportation (Medical): No     Lack of Transportation (Non-Medical): Yes   Physical Activity: Unknown (4/22/2024)    Received from [facility]    Exercise Vital Sign     Days of Exercise per Week: Patient declined     Minutes of Exercise per Session: 0 min   Stress: Patient Declined (4/22/2024)    Received from [facility]    [employer] [employer] - Occupational Stress Questionnaire     Feeling of Stress : Patient declined   Housing Stability: Patient Declined (4/22/2024)    Received from [facility]    Housing Stability Vital Sign     Unable to Pay for Housing in the Last Year: Patient declined     Unstable Housing in the Last Year: Patient declined   [2]   Current Outpatient Medications:      albuterol (PROVENTIL/VENTOLIN HFA) 90 mcg/actuation inhaler, SMARTSI Puff(s) Via Inhaler Every 6 Hours, Disp: , Rfl:     aspirin (ECOTRIN) 81 MG EC tablet, 1 tablet., Disp: , Rfl:     azelastine (ASTELIN) 137 mcg (0.1 %) nasal spray, SMARTSIG:Both Nares, Disp: , Rfl:     beclomethasone (QVAR) 40 mcg/actuation Aero, Inhale 1 puff into the lungs 2 (two) times daily. Controller, Disp: , Rfl:     benralizumab 30 mg/mL AtIn, Inject 30 mg into the skin., Disp: , Rfl:     blood sugar diagnostic Strp, USE AS DIRECTED TWICE DAILY TO CHECK BLOOD GLUCOSE, Disp: , Rfl:     cetirizine (ZYRTEC) 10 MG tablet, Take 10 mg by mouth once daily., Disp: , Rfl:     clopidogrel (PLAVIX) 75 mg tablet, Take 75 mg by mouth once daily., Disp: , Rfl:     dapagliflozin (FARXIGA) 10 mg tablet, 1 tablet., Disp: , Rfl:     diclofenac sodium (VOLTAREN) 1 % Gel, APPLY 2 TO 4 GRAMS TOPICALLY TO THE AFFECTED AREA FOUR TIMES DAILY AS NEEDED, Disp: , Rfl:     docusate sodium (COLACE) 100 MG capsule, Take 100 mg by mouth., Disp: , Rfl:     doxepin (SINEQUAN) 50 MG capsule, Take 50 mg by mouth., Disp: , Rfl:     ergocalciferol (ERGOCALCIFEROL) 50,000 unit Cap, Take 1 capsule by mouth once a week., Disp: , Rfl:     EScitalopram oxalate (LEXAPRO) 10 MG tablet, 1 tablet., Disp: , Rfl:     EScitalopram oxalate (LEXAPRO) 20 MG tablet, Take 20 mg by mouth., Disp: , Rfl:     ferrous sulfate 325 mg (65 mg iron) Tab tablet, Take 325 mg by mouth daily with breakfast., Disp: , Rfl:     fluticasone propionate (FLONASE) 50 mcg/actuation nasal spray, SHAKE LIQUID AND USE 2 SPRAYS IN EACH NOSTRIL DAILY, Disp: , Rfl:     gabapentin (NEURONTIN) 300 MG capsule, Take 300 mg by mouth 3 (three) times daily., Disp: , Rfl:     lancets 30 gauge Misc, 2 (two) times daily., Disp: , Rfl:     mirabegron (MYRBETRIQ) 50 mg Tb24, 1 tablet., Disp: , Rfl:     mirtazapine (REMERON) 15 MG tablet, Take 30 mg by mouth every evening., Disp: , Rfl:     montelukast (SINGULAIR) 10 mg tablet,  Take 10 mg by mouth every evening., Disp: , Rfl:     nitroGLYCERIN (NITROSTAT) 0.4 MG SL tablet, Place 0.4 mg under the tongue every 5 (five) minutes as needed for Chest pain., Disp: , Rfl:     omeprazole (PRILOSEC) 40 MG capsule, Take 40 mg by mouth once daily., Disp: , Rfl:     ondansetron (ZOFRAN) 8 MG tablet, 1 tablet as needed., Disp: , Rfl:     oxybutynin (DITROPAN) 5 MG Tab, Take 5 mg by mouth 3 (three) times daily., Disp: , Rfl:     potassium chloride SA (K-DUR,KLOR-CON) 20 MEQ tablet, Take 20 mEq by mouth., Disp: , Rfl:     pravastatin (PRAVACHOL) 10 MG tablet, Take 40 mg by mouth once daily., Disp: , Rfl:     pregabalin (LYRICA) 75 MG capsule, 1 capsule., Disp: , Rfl:     rosuvastatin (CRESTOR) 40 MG Tab, Take 1 tablet by mouth once daily., Disp: , Rfl:     SPIRIVA WITH HANDIHALER 18 mcg inhalation capsule, 1 capsule., Disp: , Rfl:     SYMBICORT 160-4.5 mcg/actuation HFAA, INHALE 2 PUFFS INTO LUNGS BID, Disp: , Rfl:     tiZANidine (ZANAFLEX) 4 MG tablet, 1 tablet as needed., Disp: , Rfl:     tiZANidine (ZANAFLEX) 4 MG tablet, Take 4 mg by mouth 3 (three) times daily., Disp: , Rfl:     topiramate (TOPAMAX) 50 MG tablet, Take 1 tablet by mouth 2 (two) times daily., Disp: , Rfl:     torsemide (DEMADEX) 20 MG Tab, 1 tablet., Disp: , Rfl:     traZODone (DESYREL) 50 MG tablet, , Disp: , Rfl:     umeclidinium-vilanteroL (ANORO ELLIPTA) 62.5-25 mcg/actuation DsDv, Inhale into the lungs. Controller, Disp: , Rfl:     vitamin D 1000 units Tab, Take 1,000 Units by mouth once daily., Disp: , Rfl:     aspirin 325 MG tablet, Take 325 mg by mouth once daily. (Patient not taking: Reported on 4/17/2025), Disp: , Rfl:     propranoloL (INDERAL) 20 MG tablet, Take 20 mg by mouth., Disp: , Rfl:     TRULICITY 1.5 mg/0.5 mL pen injector, Inject into the skin. (Patient not taking: Reported on 5/15/2025), Disp: , Rfl:

## 2025-06-30 ENCOUNTER — PATIENT MESSAGE (OUTPATIENT)
Dept: PREADMISSION TESTING | Facility: HOSPITAL | Age: 60
End: 2025-06-30
Payer: MEDICAID

## 2025-06-30 RX ORDER — HYDROXYZINE HYDROCHLORIDE 50 MG/1
50 TABLET, FILM COATED ORAL 2 TIMES DAILY PRN
COMMUNITY
Start: 2025-06-16

## 2025-06-30 RX ORDER — EMPAGLIFLOZIN 10 MG/1
10 TABLET, FILM COATED ORAL DAILY
COMMUNITY

## 2025-06-30 RX ORDER — ACETAMINOPHEN 500 MG
500 TABLET ORAL 2 TIMES DAILY PRN
COMMUNITY
Start: 2025-02-28 | End: 2025-07-10 | Stop reason: HOSPADM

## 2025-06-30 RX ORDER — CLONAZEPAM 0.5 MG/1
0.75 TABLET ORAL NIGHTLY
COMMUNITY
Start: 2025-05-12 | End: 2025-08-10

## 2025-06-30 RX ORDER — METHYLPHENIDATE HYDROCHLORIDE 10 MG/1
10 TABLET ORAL EVERY MORNING
COMMUNITY
Start: 2025-06-16

## 2025-06-30 RX ORDER — TIRZEPATIDE 7.5 MG/.5ML
7.5 INJECTION, SOLUTION SUBCUTANEOUS
COMMUNITY
Start: 2025-06-30

## 2025-06-30 NOTE — ANESTHESIA PAT ROS NOTE
06/30/2025  Lucita Jackson is a 60 y.o., female.      Pre-op Assessment    I have reviewed the Patient Summary Reports.       I have reviewed the Medications.     Review of Systems  Anesthesia Hx:  No problems with previous Anesthesia   History of prior surgery of interest to airway management or planning:  Previous anesthesia: General, MAC 7/22/2024  Cystoscopy, Botox injection with general anesthesia.       5/22/2025  Colonoscopy with MAC.  Procedure performed at an [facility]. Airway issues documented on chart review include GETA, videolaryngoscope used  , view on video-laryngoscopy Grade I      Denies Personal Hx of Anesthesia complications.                    Social:  Former Smoker, Social Alcohol Use Former smoker of cigars, quit smoking in 1995,  Types: 2 Glasses of wine per week      Hematology/Oncology:    Oncology Normal    -- Anemia:               Hematology Comments: SELENA (iron deficiency anemia),  Sickle cell trait,  Anticoagulant long-time use- takes Aspirin and Plavix for CVA prevention                    EENT/Dental:   Allergies, LATEX ALLERGY,  Seasonal allergies,  H/O T&A,  Eye surgery          Cardiovascular:  Exercise tolerance: good   Hypertension, well controlled Valvular problems/Murmurs  Denies MI.     Denies CABG/stent.    Denies Angina. CHF    hyperlipidemia  Denies LOPEZ.  ECG has been reviewed. Heart valve problem,  Cardiac valve surgery,  HFpEF, recommend risk factor modification and medication Patient on beta blockers                          Pulmonary:   COPD Asthma moderate  Denies Shortness of breath.  Sleep Apnea, CPAP moderate restriction of lung capacity due to obesity and likely anxiety.  Moderate persistent asthma, takes several controllers and rescue inhaler               Renal/:  Chronic Renal Disease, CKD   Mixed stress and urge urinary  incontinence,  Overactive detrusor,  H/O Pyelonephritis,  BUN = 13, Creat = 0.93, eGFR = 71 on 3/12/2025             Hepatic/GI:    Hiatal Hernia, GERD   H/O colon polyps,  Diverticulosis,  Has not started taking Mounjaro- will hold until after surgery   Taking GLP-1 Agonists Instructed to Hold for 7 Days           Musculoskeletal:     Tear of right rotator cuff,   H/O leg surgery for fracture,  Hammertoes of both feet        Spine Disorders: cervical Degenerative disease           Neurological:   CVA, residual symptoms    Seizures, well controlled    Cervical radiculopathy   Brachial neuritis or radiculitis nos,  Diabetic polyneuropathy,  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 Taking Topamax for seizures        Peripheral Neuropathy                          Endocrine:  Diabetes, well controlled, type 2 Denies Hypothyroidism.  HA1C = 5.8 on 2/21/2025      Morbid Obesity / BMI > 40  Psych:   anxiety                Past Medical History:   Diagnosis Date    Anticoagulant long-term use     Asthma     CHF (congestive heart failure)     COPD (chronic obstructive pulmonary disease)     CVA (cerebral vascular accident)     Diabetes mellitus     Seizure     Sleep apnea      Past Surgical History:   Procedure Laterality Date    COLONOSCOPY  05/22/2025    COLONOSCOPY N/A 5/22/2025    Procedure: COLONOSCOPY;  Surgeon: Toni Coleman MD;  Location: [facility];  Service: Endoscopy;  Laterality: N/A;    LEG SURGERY Right        Anesthesia Assessment: Preoperative EQUATION    Planned Procedure: Procedure(s) (LRB):  ARTHROSCOPY, SHOULDER, W/ ROTATOR CUFF REPAIR (Right)  ARTHROSCOPY, SHOULDER, W/ SUBACROMIAL DECOMPRESSION (Right)  ACROMIOPLASTY (Right)  Requested Anesthesia Type:General/Regional  Surgeon: Ramsey Montague MD  Service: Orthopedics  Known or anticipated Date of  Surgery:7/11/2025    Surgeon notes: reviewed    Electronic QUestionnaire Assessment completed via nurse interview with patient.        Triage considerations:     The patient has no apparent active cardiac condition (No unstable coronary Syndrome such as severe unstable angina or recent [<1 month] myocardial infarction, decompensated CHF, severe valvular   disease or significant arrhythmia)    Previous anesthesia records:GETA, MAC, and No problems, video laryngoscopy, 7.0 ETT    Last PCP note: within 1 month , outside [facility]   Subspecialty notes: Cardiology: General    Other important co-morbidities: CHF, COPD, DM2, GERD, HLD, HTN, Morbid Obesity, and GENEVA       Outside EKG 7/2/2024:   Ref Range & Units 12 mo ago   VENTRICULAR RATE BPM 71   ATRIAL RATE BPM 71   P-R INTERVAL ms 160   QRS DURATION ms 86   Q-T INTERVAL ms 388   QTC CALCULATION(BEZET) ms 421   P AXIS degrees 37   R AXIS degrees -9   T AXIS degrees 39   INTERPRETATION (MUSE)  Normal sinus rhythm Possible Anterior infarct , age undetermined  When compared with ECG of 05-OCT-2023 23:40, No significant change was found Confirmed by Morteza Whittington ([id number]) on 7/2/2024 5:20:35 PM          Echo 8/20/2024:  SUMMARY:    1. Normal left ventricular systolic function. LVEF of > 55%.    2. LV diastolic function is normal.    3. Normal right ventricular systolic function.    4. Insufficient TR jet to estimate PASP.    5. Normal central venous pressure.   Electronically signed by KEVIN JARQUIN MD on 8/20/2024 at 5:22:19 PM       Cardiac Cath 2/16/2023:  IMPRESSIONS:   · Normal resting right and left heart filling pressures and PA pressures   · No significant left to right shunt by screening oximetry   · Exercise induced rise in PCWP and PA pressures consistent with WHO2 PH       CTA head and Neck 12/21/2022:  IMPRESSION:   No evidence of significant stenosis, occlusion, aneurysm dilation, or dissection involving the arteries of the head or neck.   Electronically Signed  By: Woody Hebert MD 12/21/2022 10:37 AM CST       Nuclear Stress Test 8/11/2022:  Impression  1. No myocardial perfusion defects identified.  2. Diffuse myocardial thickening with elevated rest and stress LV ejection fractions; findings would be compatible with HFpEF.  3. Normal blood pressure response to regadenoson.  4. Normal ECG response to regadenoson.  5. No prognostically significant arrhythmias were noted during the protocol.      PFT's 12/6/2023:  No evidence of obstruction on spirometry. Moderate complex restriction on lung volumes. Moderate reduction in diffusing capacity, corrected for hemoglobin. Low VA. Normal Kco. These findings are more consistent with interstitial lung disease.     PFT's 12/6/2023:  PRE- BD                                                                        Post-BD   DLCOunc (ml/min/mmHg) 11.29 17.54 3.53 -2.73 64   DLCOcor (ml/min/mmHg) 11.92 17.54 3.53 -2.41 67   DL/VA (ml/min/mmHg/L) 5.40 4.43 121   Hgb (gm/dL) 11.8 12-18   VA (L) 2.21 3.96 3.22 -4.20 55   TLC (SB) (L) 2.36   Kco (ml/min/mmHg/L) 5.40 4.48 3.47 +1.35 120       Tests already available:  Results have been reviewed.             Instructions given. (See in Nurse's note) Preop medication instructions sent via portal message.     Optimization:  Anesthesia Preop Clinic Assessment Not Indicated    Medical Opinion Indicated: Yes, PCP?       Sub-specialist consult indicated:   Cardiology      Plan: Consultation:Patient's PCP for a statement of optimization     Patient  has previously scheduled Medical Appointment:    Navigation: Patient is cleared/ optimized for surgery by PCP and Cardiology.     Perioperative Cardiovascular Risk Assessment for Noncardiac Procedure/Surgery Per Cardiology note:  - Planned/Proposed Procedure/Surgery: rotator cuff repair  - Timing of Procedure/Surgery: Elective  - Risk of Procedure/Surgery: Intermediate  - METS: <4  - Active Cardiac Conditions: No current or active  - RCRI Score: 1  -  "RCRI Risk Factors: History of CVA  - Other CV Risk Factors: Obesity and Physical Inactivity  - The patient is low risk (<1% risk of MACE) for a Low-Intermediate procedure  Noting negative stress test in 2022  - No further cardiac workup is indicated at this time     Per PCP, "she is low risk for surgery. She may hold Plavix 5 days prior to surgery and it should be resumed within 48 hours."      Ht: 4'11  Wt: 100.9 kg (222 lb)  BMI: 44.93  "

## 2025-07-10 ENCOUNTER — ANESTHESIA EVENT (OUTPATIENT)
Dept: SURGERY | Facility: HOSPITAL | Age: 60
End: 2025-07-10
Payer: MEDICAID

## 2025-07-10 ENCOUNTER — TELEPHONE (OUTPATIENT)
Dept: ORTHOPEDICS | Facility: CLINIC | Age: 60
End: 2025-07-10
Payer: MEDICAID

## 2025-07-10 PROBLEM — M75.101 TEAR OF RIGHT ROTATOR CUFF: Status: ACTIVE | Noted: 2025-07-10

## 2025-07-10 RX ORDER — ASPIRIN 81 MG/1
81 TABLET ORAL DAILY
Qty: 30 TABLET | Refills: 0 | Status: SHIPPED | OUTPATIENT
Start: 2025-07-10 | End: 2026-07-10

## 2025-07-10 RX ORDER — OXYCODONE HYDROCHLORIDE 5 MG/1
5 TABLET ORAL EVERY 4 HOURS PRN
Qty: 20 TABLET | Refills: 0 | Status: SHIPPED | OUTPATIENT
Start: 2025-07-10

## 2025-07-10 RX ORDER — IBUPROFEN 600 MG/1
600 TABLET, FILM COATED ORAL 3 TIMES DAILY
Qty: 42 TABLET | Refills: 0 | Status: SHIPPED | OUTPATIENT
Start: 2025-07-10

## 2025-07-10 RX ORDER — DEXTROMETHORPHAN HYDROBROMIDE, GUAIFENESIN 5; 100 MG/5ML; MG/5ML
650 LIQUID ORAL EVERY 8 HOURS
Qty: 42 TABLET | Refills: 0 | Status: SHIPPED | OUTPATIENT
Start: 2025-07-10

## 2025-07-10 NOTE — TELEPHONE ENCOUNTER
Left voicemail with the patient for 6 AM arrival time for surgery.    Feliz Tejeda MS, OT  Orthopedic Clinical / OR Assistant to Dr. Ramsey Montague

## 2025-07-11 ENCOUNTER — ANESTHESIA (OUTPATIENT)
Dept: SURGERY | Facility: HOSPITAL | Age: 60
End: 2025-07-11
Payer: MEDICAID

## 2025-07-11 ENCOUNTER — HOSPITAL ENCOUNTER (OUTPATIENT)
Facility: HOSPITAL | Age: 60
Discharge: HOME | End: 2025-07-11
Attending: SURGERY | Admitting: SURGERY
Payer: MEDICAID

## 2025-07-11 VITALS
HEIGHT: 59 IN | WEIGHT: 222 LBS | SYSTOLIC BLOOD PRESSURE: 128 MMHG | OXYGEN SATURATION: 97 % | HEART RATE: 85 BPM | TEMPERATURE: 98 F | BODY MASS INDEX: 44.76 KG/M2 | RESPIRATION RATE: 20 BRPM | DIASTOLIC BLOOD PRESSURE: 70 MMHG

## 2025-07-11 DIAGNOSIS — M75.101 TEAR OF RIGHT ROTATOR CUFF: Primary | ICD-10-CM

## 2025-07-11 DIAGNOSIS — S46.011A TRAUMATIC TEAR OF RIGHT ROTATOR CUFF, UNSPECIFIED TEAR EXTENT, INITIAL ENCOUNTER: ICD-10-CM

## 2025-07-11 LAB
POCT GLUCOSE: 141 MG/DL (ref 70–110)
POCT GLUCOSE: 77 MG/DL (ref 70–110)

## 2025-07-11 PROCEDURE — 37000009 HC ANESTHESIA EA ADD 15 MINS: Performed by: SURGERY

## 2025-07-11 PROCEDURE — 25000003 PHARM REV CODE 250: Performed by: NURSE PRACTITIONER

## 2025-07-11 PROCEDURE — 25000242 PHARM REV CODE 250 ALT 637 W/ HCPCS: Performed by: NURSE ANESTHETIST, CERTIFIED REGISTERED

## 2025-07-11 PROCEDURE — 36000710: Performed by: SURGERY

## 2025-07-11 PROCEDURE — 36000711: Performed by: SURGERY

## 2025-07-11 PROCEDURE — 29823 SHO ARTHRS SRG XTNSV DBRDMT: CPT | Mod: 51,RT,, | Performed by: SURGERY

## 2025-07-11 PROCEDURE — 63600175 PHARM REV CODE 636 W HCPCS: Performed by: SURGERY

## 2025-07-11 PROCEDURE — 64415 NJX AA&/STRD BRCH PLXS IMG: CPT | Performed by: STUDENT IN AN ORGANIZED HEALTH CARE EDUCATION/TRAINING PROGRAM

## 2025-07-11 PROCEDURE — 29828 SHO ARTHRS SRG BICP TENODSIS: CPT | Mod: 22,RT,, | Performed by: SURGERY

## 2025-07-11 PROCEDURE — 63600175 PHARM REV CODE 636 W HCPCS: Performed by: NURSE PRACTITIONER

## 2025-07-11 PROCEDURE — 25000003 PHARM REV CODE 250: Performed by: NURSE ANESTHETIST, CERTIFIED REGISTERED

## 2025-07-11 PROCEDURE — 63600175 PHARM REV CODE 636 W HCPCS: Performed by: NURSE ANESTHETIST, CERTIFIED REGISTERED

## 2025-07-11 PROCEDURE — 71000015 HC POSTOP RECOV 1ST HR: Performed by: SURGERY

## 2025-07-11 PROCEDURE — 82962 GLUCOSE BLOOD TEST: CPT | Performed by: SURGERY

## 2025-07-11 PROCEDURE — 25000003 PHARM REV CODE 250: Performed by: STUDENT IN AN ORGANIZED HEALTH CARE EDUCATION/TRAINING PROGRAM

## 2025-07-11 PROCEDURE — 25000003 PHARM REV CODE 250: Performed by: SURGERY

## 2025-07-11 PROCEDURE — 71000033 HC RECOVERY, INTIAL HOUR: Performed by: SURGERY

## 2025-07-11 PROCEDURE — 71000016 HC POSTOP RECOV ADDL HR: Performed by: SURGERY

## 2025-07-11 PROCEDURE — 37000008 HC ANESTHESIA 1ST 15 MINUTES: Performed by: SURGERY

## 2025-07-11 PROCEDURE — 27201423 OPTIME MED/SURG SUP & DEVICES STERILE SUPPLY: Performed by: SURGERY

## 2025-07-11 PROCEDURE — 99900035 HC TECH TIME PER 15 MIN (STAT)

## 2025-07-11 PROCEDURE — 63600175 PHARM REV CODE 636 W HCPCS: Performed by: STUDENT IN AN ORGANIZED HEALTH CARE EDUCATION/TRAINING PROGRAM

## 2025-07-11 PROCEDURE — 94761 N-INVAS EAR/PLS OXIMETRY MLT: CPT

## 2025-07-11 DEVICE — SUTURETAPE FIBERSTICK 1.3MM BLUE
Type: IMPLANTABLE DEVICE | Site: SHOULDER | Status: FUNCTIONAL
Brand: ARTHREX®

## 2025-07-11 RX ORDER — FENTANYL CITRATE 50 UG/ML
25 INJECTION, SOLUTION INTRAMUSCULAR; INTRAVENOUS EVERY 5 MIN PRN
Status: DISCONTINUED | OUTPATIENT
Start: 2025-07-11 | End: 2025-07-11 | Stop reason: HOSPADM

## 2025-07-11 RX ORDER — ROPIVACAINE HYDROCHLORIDE 5 MG/ML
INJECTION, SOLUTION EPIDURAL; INFILTRATION; PERINEURAL
Status: COMPLETED | OUTPATIENT
Start: 2025-07-11 | End: 2025-07-11

## 2025-07-11 RX ORDER — ACETAMINOPHEN 500 MG
1000 TABLET ORAL
Status: COMPLETED | OUTPATIENT
Start: 2025-07-11 | End: 2025-07-11

## 2025-07-11 RX ORDER — ALBUTEROL SULFATE 90 UG/1
INHALANT RESPIRATORY (INHALATION)
Status: DISCONTINUED | OUTPATIENT
Start: 2025-07-11 | End: 2025-07-11

## 2025-07-11 RX ORDER — ONDANSETRON HYDROCHLORIDE 2 MG/ML
INJECTION, SOLUTION INTRAVENOUS
Status: DISCONTINUED | OUTPATIENT
Start: 2025-07-11 | End: 2025-07-11

## 2025-07-11 RX ORDER — ROPIVACAINE HYDROCHLORIDE 2 MG/ML
INJECTION, SOLUTION EPIDURAL; INFILTRATION; PERINEURAL CONTINUOUS
Status: DISCONTINUED | OUTPATIENT
Start: 2025-07-11 | End: 2025-07-11 | Stop reason: HOSPADM

## 2025-07-11 RX ORDER — ROCURONIUM BROMIDE 10 MG/ML
INJECTION, SOLUTION INTRAVENOUS
Status: DISCONTINUED | OUTPATIENT
Start: 2025-07-11 | End: 2025-07-11

## 2025-07-11 RX ORDER — PROPOFOL 10 MG/ML
VIAL (ML) INTRAVENOUS
Status: DISCONTINUED | OUTPATIENT
Start: 2025-07-11 | End: 2025-07-11

## 2025-07-11 RX ORDER — FENTANYL CITRATE 50 UG/ML
INJECTION, SOLUTION INTRAMUSCULAR; INTRAVENOUS
Status: DISCONTINUED | OUTPATIENT
Start: 2025-07-11 | End: 2025-07-11

## 2025-07-11 RX ORDER — CELECOXIB 200 MG/1
400 CAPSULE ORAL
Status: COMPLETED | OUTPATIENT
Start: 2025-07-11 | End: 2025-07-11

## 2025-07-11 RX ORDER — LIDOCAINE HYDROCHLORIDE 20 MG/ML
INJECTION INTRAVENOUS
Status: DISCONTINUED | OUTPATIENT
Start: 2025-07-11 | End: 2025-07-11

## 2025-07-11 RX ORDER — IPRATROPIUM BROMIDE AND ALBUTEROL SULFATE 2.5; .5 MG/3ML; MG/3ML
3 SOLUTION RESPIRATORY (INHALATION) ONCE
Status: DISCONTINUED | OUTPATIENT
Start: 2025-07-11 | End: 2025-07-11 | Stop reason: HOSPADM

## 2025-07-11 RX ORDER — FAMOTIDINE 10 MG/ML
INJECTION, SOLUTION INTRAVENOUS
Status: DISCONTINUED | OUTPATIENT
Start: 2025-07-11 | End: 2025-07-11

## 2025-07-11 RX ORDER — SODIUM CHLORIDE 9 MG/ML
INJECTION, SOLUTION INTRAVENOUS CONTINUOUS
Status: DISCONTINUED | OUTPATIENT
Start: 2025-07-11 | End: 2025-07-11 | Stop reason: HOSPADM

## 2025-07-11 RX ORDER — EPINEPHRINE 1 MG/ML
INJECTION, SOLUTION, CONCENTRATE INTRAVENOUS
Status: DISCONTINUED | OUTPATIENT
Start: 2025-07-11 | End: 2025-07-11 | Stop reason: HOSPADM

## 2025-07-11 RX ORDER — DEXAMETHASONE SODIUM PHOSPHATE 4 MG/ML
INJECTION, SOLUTION INTRA-ARTICULAR; INTRALESIONAL; INTRAMUSCULAR; INTRAVENOUS; SOFT TISSUE
Status: DISCONTINUED | OUTPATIENT
Start: 2025-07-11 | End: 2025-07-11

## 2025-07-11 RX ORDER — CEFAZOLIN 2 G/1
2 INJECTION, POWDER, FOR SOLUTION INTRAMUSCULAR; INTRAVENOUS
Status: COMPLETED | OUTPATIENT
Start: 2025-07-11 | End: 2025-07-11

## 2025-07-11 RX ORDER — OXYCODONE HYDROCHLORIDE 5 MG/1
5 TABLET ORAL
Status: DISCONTINUED | OUTPATIENT
Start: 2025-07-11 | End: 2025-07-11 | Stop reason: HOSPADM

## 2025-07-11 RX ORDER — SODIUM CHLORIDE 0.9 % (FLUSH) 0.9 %
10 SYRINGE (ML) INJECTION
Status: DISCONTINUED | OUTPATIENT
Start: 2025-07-11 | End: 2025-07-11 | Stop reason: HOSPADM

## 2025-07-11 RX ORDER — MUPIROCIN 20 MG/G
OINTMENT TOPICAL
Status: DISCONTINUED | OUTPATIENT
Start: 2025-07-11 | End: 2025-07-11 | Stop reason: HOSPADM

## 2025-07-11 RX ORDER — ONDANSETRON 4 MG/1
8 TABLET, ORALLY DISINTEGRATING ORAL EVERY 8 HOURS PRN
Status: DISCONTINUED | OUTPATIENT
Start: 2025-07-11 | End: 2025-07-11 | Stop reason: HOSPADM

## 2025-07-11 RX ORDER — MIDAZOLAM HYDROCHLORIDE 1 MG/ML
0.5 INJECTION, SOLUTION INTRAMUSCULAR; INTRAVENOUS
Status: DISCONTINUED | OUTPATIENT
Start: 2025-07-11 | End: 2025-07-11 | Stop reason: HOSPADM

## 2025-07-11 RX ORDER — METHOCARBAMOL 500 MG/1
1000 TABLET, FILM COATED ORAL ONCE AS NEEDED
Status: COMPLETED | OUTPATIENT
Start: 2025-07-11 | End: 2025-07-11

## 2025-07-11 RX ORDER — MUPIROCIN 20 MG/G
OINTMENT TOPICAL 2 TIMES DAILY
Status: DISCONTINUED | OUTPATIENT
Start: 2025-07-11 | End: 2025-07-11 | Stop reason: HOSPADM

## 2025-07-11 RX ADMIN — ACETAMINOPHEN 1000 MG: 500 TABLET ORAL at 07:07

## 2025-07-11 RX ADMIN — ROCURONIUM BROMIDE 50 MG: 10 INJECTION, SOLUTION INTRAVENOUS at 08:07

## 2025-07-11 RX ADMIN — SODIUM CHLORIDE: 9 INJECTION, SOLUTION INTRAVENOUS at 07:07

## 2025-07-11 RX ADMIN — PROPOFOL 100 MG: 10 INJECTION, EMULSION INTRAVENOUS at 08:07

## 2025-07-11 RX ADMIN — LIDOCAINE HYDROCHLORIDE 100 MG: 20 INJECTION INTRAVENOUS at 08:07

## 2025-07-11 RX ADMIN — PROPOFOL 50 MG: 10 INJECTION, EMULSION INTRAVENOUS at 08:07

## 2025-07-11 RX ADMIN — CEFAZOLIN 2 G: 2 INJECTION, POWDER, FOR SOLUTION INTRAMUSCULAR; INTRAVENOUS at 09:07

## 2025-07-11 RX ADMIN — DEXAMETHASONE SODIUM PHOSPHATE 8 MG: 4 INJECTION, SOLUTION INTRAMUSCULAR; INTRAVENOUS at 08:07

## 2025-07-11 RX ADMIN — SODIUM CHLORIDE, SODIUM GLUCONATE, SODIUM ACETATE, POTASSIUM CHLORIDE, MAGNESIUM CHLORIDE, SODIUM PHOSPHATE, DIBASIC, AND POTASSIUM PHOSPHATE: .53; .5; .37; .037; .03; .012; .00082 INJECTION, SOLUTION INTRAVENOUS at 10:07

## 2025-07-11 RX ADMIN — ALBUTEROL SULFATE 4 PUFF: 108 AEROSOL, METERED RESPIRATORY (INHALATION) at 10:07

## 2025-07-11 RX ADMIN — SODIUM CHLORIDE: 0.9 INJECTION, SOLUTION INTRAVENOUS at 08:07

## 2025-07-11 RX ADMIN — CELECOXIB 400 MG: 200 CAPSULE ORAL at 07:07

## 2025-07-11 RX ADMIN — SUGAMMADEX 200 MG: 100 INJECTION, SOLUTION INTRAVENOUS at 10:07

## 2025-07-11 RX ADMIN — PROPOFOL 50 MG: 10 INJECTION, EMULSION INTRAVENOUS at 10:07

## 2025-07-11 RX ADMIN — ONDANSETRON 4 MG: 2 INJECTION INTRAMUSCULAR; INTRAVENOUS at 09:07

## 2025-07-11 RX ADMIN — ROPIVACAINE HYDROCHLORIDE 10 ML: 5 INJECTION, SOLUTION EPIDURAL; INFILTRATION; PERINEURAL at 08:07

## 2025-07-11 RX ADMIN — FENTANYL CITRATE 50 MCG: 50 INJECTION, SOLUTION INTRAMUSCULAR; INTRAVENOUS at 08:07

## 2025-07-11 RX ADMIN — METHOCARBAMOL 1000 MG: 500 TABLET ORAL at 11:07

## 2025-07-11 RX ADMIN — FENTANYL CITRATE 25 MCG: 50 INJECTION, SOLUTION INTRAMUSCULAR; INTRAVENOUS at 07:07

## 2025-07-11 RX ADMIN — FAMOTIDINE 20 MG: 10 INJECTION, SOLUTION INTRAVENOUS at 08:07

## 2025-07-11 RX ADMIN — MIDAZOLAM 2 MG: 1 INJECTION INTRAMUSCULAR; INTRAVENOUS at 07:07

## 2025-07-11 RX ADMIN — FENTANYL CITRATE 50 MCG: 50 INJECTION, SOLUTION INTRAMUSCULAR; INTRAVENOUS at 10:07

## 2025-07-11 RX ADMIN — MUPIROCIN: 20 OINTMENT TOPICAL at 07:07

## 2025-07-11 NOTE — ANESTHESIA PROCEDURE NOTES
Peripheral Block    Patient location during procedure: pre-op   Block not for primary anesthetic.  Reason for block: at surgeon's request and post-op pain management   Post-op Pain Location: right shoulder   Start time: 7/11/2025 7:55 AM  Timeout: 7/11/2025 7:55 AM   End time: 7/11/2025 8:05 AM    Staffing  Authorizing Provider: Mandi Mccarthy MD  Performing Provider: Mandi Mccarthy MD    Staffing  Performed by: Mandi Mccarthy MD  Authorized by: Mandi Mccarthy MD    Preanesthetic Checklist  Completed: patient identified, IV checked, site marked, risks and benefits discussed, surgical consent, monitors and equipment checked, pre-op evaluation and timeout performed  Peripheral Block  Patient position: sitting  Prep: ChloraPrep  Patient monitoring: heart rate, cardiac monitor, continuous pulse ox, continuous capnometry and frequent blood pressure checks  Block type: interscalene  Laterality: right  Injection technique: single shot  Needle  Needle type: Stimuplex   Needle gauge: 22 G  Needle length: 2 in  Needle localization: anatomical landmarks and ultrasound guidance   -ultrasound image captured on disc.  Assessment  Injection assessment: negative aspiration, negative parasthesia and local visualized surrounding nerve  Paresthesia pain: none  Heart rate change: no  Slow fractionated injection: yes  Pain Tolerance: comfortable throughout block and no complaints  Medications:    Medications: ropivacaine (NAROPIN) injection 0.5% - Perineural   10 mL - 7/11/2025 8:10:00 AM    Additional Notes  VSS.  [facility] RN monitoring vitals throughout procedure.  Patient tolerated procedure well.   10 ml 0.5% ropiv diluted with sterile saline to create 20 ml 0.25% ropiv

## 2025-07-11 NOTE — INTERVAL H&P NOTE
The patient has been examined and the H&P has been reviewed:    I concur with the findings and no changes have occurred since H&P was written.    Surgery risks, benefits and alternative options discussed and understood by patient/family.          Active Hospital Problems    Diagnosis  POA    *Tear of right rotator cuff [M75.101]  Yes      Resolved Hospital Problems   No resolved problems to display.

## 2025-07-11 NOTE — PLAN OF CARE
Pre op complete. Questions answered. Resting comfortably. Call light in reach. Personal belongings given to daughter. Pt states she will call for medicaid transportation for  after surgery. Pt states they will take 1-2 hours to .

## 2025-07-11 NOTE — DISCHARGE SUMMARY
[place] - Surgery (Hospital)  Discharge Note  Short Stay    Procedure(s) (LRB):  ARTHROSCOPY, SHOULDER, W/ SUBACROMIAL DECOMPRESSION (Right)  ACROMIOPLASTY (Right)      OUTCOME: Patient tolerated treatment/procedure well without complication and is now ready for discharge.    DISPOSITION: Home or Self Care    FINAL DIAGNOSIS:  Tear of right rotator cuff    FOLLOWUP: In clinic    DISCHARGE INSTRUCTIONS:  No discharge procedures on file.     TIME SPENT ON DISCHARGE: 10 minutes

## 2025-07-11 NOTE — TRANSFER OF CARE
"Anesthesia Transfer of Care Note    Patient: Lucita Jackson    Procedure(s) Performed: Procedure(s) (LRB):  ARTHROSCOPY, SHOULDER, W/ SUBACROMIAL DECOMPRESSION (Right)  ACROMIOPLASTY (Right)    Patient location: PACU    Anesthesia Type: general    Transport from OR: Transported from OR on 6-10 L/min O2 by face mask with adequate spontaneous ventilation    Post pain: adequate analgesia    Post assessment: no apparent anesthetic complications and tolerated procedure well    Post vital signs: stable    Level of consciousness: sedated    Nausea/Vomiting: no nausea/vomiting    Complications: none    Transfer of care protocol was followed      Last vitals: Visit Vitals  /61 (BP Location: Right arm, Patient Position: Lying)   Pulse 69   Temp 36.9 °C (98.4 °F) (Temporal)   Resp 18   Ht 4' 11" (1.499 m)   Wt 100.7 kg (222 lb)   SpO2 96%   Breastfeeding No   BMI 44.84 kg/m²     "

## 2025-07-11 NOTE — ANESTHESIA PROCEDURE NOTES
Intubation    Date/Time: 7/11/2025 8:44 AM    Performed by: Nancy Nichole CRNA  Authorized by: Mandi Mccarthy MD    Intubation:     Induction:  Intravenous    Intubated:  Postinduction    Mask Ventilation:  Easy mask    Attempts:  1    Attempted By:  CRNA    Method of Intubation:  Video laryngoscopy    Blade:  Lee 3    Laryngeal View Grade: Grade I - full view of cords      Difficult Airway Encountered?: No      Complications:  None    Airway Device:  Oral endotracheal tube    Airway Device Size:  7.0    Style/Cuff Inflation:  Cuffed    Tube secured:  21    Secured at:  The lips    Placement Verified By:  Capnometry    Complicating Factors:  None    Findings Post-Intubation:  BS equal bilateral and atraumatic/condition of teeth unchanged

## 2025-07-11 NOTE — INTERVAL H&P NOTE
The patient has been examined and the H&P has been reviewed:    I concur with the findings and no changes have occurred since H&P was written.    Surgery risks, benefits and alternative options discussed and understood by patient/family.    Specifically, we discussed the comorbidities she has including fibromyalgia and obesity and the effect of those comorbidities on outcomes.    We had a long discussion of the risks and benefits of different operative and non-operative options. I explained the injury using pictures, models, and the patient's MRI images. I explained the risks of surgery which include but are not limited to continued pain, recurrence/dislocations, deformity, bleeding, infection, damage to surrounding structures, VTEs, swelling of the face and chest, recurrence, arthritis, re-tear, loose bodies and need for further procedures. I explained the risks of co-morbidities which influences the rate of complications. I explained the risks of non-operative management, including continued pain, long term immobilization, rapid arthritis progression, and dislocations. I discussed all of these risks using non-medical terms and confirmed understanding. The patient elected for right shoulder arthroscopy, rotator cuff debridement vs repair, biceps surgery, distal clavicle excision and subacromial decompression.    Active Hospital Problems    Diagnosis  POA    *Tear of right rotator cuff [M75.101]  Yes      Resolved Hospital Problems   No resolved problems to display.

## 2025-07-11 NOTE — BRIEF OP NOTE
[place] - Surgery ([facility])  Brief Operative Note    Surgery Date: 7/11/2025     Surgeons and Role:     * Ramsey Montague MD - Primary    Assisting Surgeon: None    Pre-op Diagnosis:  Tear of right rotator cuff, unspecified tear extent, unspecified whether traumatic [M75.101]    Post-op Diagnosis:  Post-Op Diagnosis Codes:     * Tear of right rotator cuff, unspecified tear extent, unspecified whether traumatic [M75.101]    Procedure(s) (LRB):  ARTHROSCOPY, SHOULDER, W/ SUBACROMIAL DECOMPRESSION (Right)  ACROMIOPLASTY (Right)    Anesthesia: General/Regional    Operative Findings: See op note.    Estimated Blood Loss: 0 mL         Specimens:   Specimen (24h ago, onward)      None            * No specimens in log *        Discharge Note    OUTCOME: Patient tolerated treatment/procedure well without complication and is now ready for discharge.    DISPOSITION: Home or Self Care    FINAL DIAGNOSIS:  Tear of right rotator cuff    FOLLOWUP: In clinic    DISCHARGE INSTRUCTIONS:  No discharge procedures on file.

## 2025-07-11 NOTE — OP NOTE
DATE OF PROCEDURE:  7/11/2025    PREOPERATIVE DIAGNOSIS:   Right Shoulder  1.  Partial thickness tear, rotator cuff.  2.  Subacromial impingement.  3.  Biceps tear with biceps tendinopathy.      POSTOPERATIVE DIAGNOSIS:   Shoulder  1.  partial thickness tear, rotator cuff.  2.  Subacromial impingement.  3.  Biceps tear with biceps tendinopathy.  4.  Labral tear    PROCEDURE:    1.  Arthroscopic rotator cuff repair.    2.  Biceps tenodesis  3. Subacromial decompression.  4. Intraarticular debridement glenohumeral joint.     SURGEON:  Ramsey Montague MD     ASSISTANT:  Ileana Odom    PROCEDURE IN DETAIL:  The patient was brought to the Operating Suite after a   continuous scalene nerve block was administered. The patient was placed in lateral position, all   bony prominences were very well padded.  The arm was prepped and draped   in the standard fashion. A standard posterior inferior mid   acromial portal was established and the intraarticular space was entered. A diagnostic tour of the shoulder was undertaken, noting the following: biceps tendinopathy, severely inflamed bursa, partial thickness tear of the rotator cuff. The   biceps was noted to be torn as it entered into the bicipital groove, and an arthroscopic biceps tenodesis was performed. Using the arthroscopic scissors and cautery the biceps was taken back and tied.  The superior labrum and posterior and inferior glenoid where the arthritic change was noted   were all debrided with arthroscopic shaver back to stable rims.  The attention was then turned to the rotator cuff.  A lateral subacromial portal was made and subacromial space accessed. The rotator cuff delineated. Significant bursectomy was performed. The rotator cuff was debrided.    The subacromial spur was fully delineated from anterior to posterior and subacromial decompression was performed back to the stable acromial edge. The distal clavicle was examined and no kellie performed.     The  procedure complete, the arthroscopic instrumentation was removed.  The incisions were closed with nylon suture and steri strips.  The patient was placed in a sterile compressive dressing, sling and swathe, awakened, and transferred to the Recovery Room in good condition.    POSTOPERATIVE COURSE:  We will discharge the patient home from the hospital today  Nonweight bearing to the operative extremity  Sling at all times until follow up  Oxycodone PRN q6h for pain  Motrin Q6H standing  Gabapentin PRN for nerve pain * 3 days    COMPLEX PROCEDURE:    There was an altered surgical field. There was abnormal anatomy. There was major scarring due to the obesity, and disuse contractures nature of the case to the area around the right shoulder. Complexity of the service was much greater than the normative procedure.  There was increased time, intensity and technical difficulty of the procedure, severity of the patient's condition and mental effort required.  This was a highly complicated procedure that required advanced surgical skill in order to safely and technically perform it.  Nevertheless the outcome achieved was excellent.

## 2025-07-11 NOTE — PROGRESS NOTES
Patient complaining of shortness of breath. Anesthesia MD at bedside to speak to patient and family.

## 2025-07-11 NOTE — ANESTHESIA PREPROCEDURE EVALUATION
07/10/2025    Lucita Jackson is a 60 y.o. female  who presents  for Procedure(s):  ARTHROSCOPY, SHOULDER, W/ ROTATOR CUFF REPAIR  ARTHROSCOPY, SHOULDER, W/ SUBACROMIAL DECOMPRESSION  ACROMIOPLASTY        Review of patient's allergies indicates:   Allergen Reactions    Sulfa (sulfonamide antibiotics) Hives and Other (See Comments)    Tomato Other (See Comments)    Latex Rash and Other (See Comments)    Tomato (solanum lycopersicum) Hives and Rash       Wt Readings from Last 1 Encounters:   06/18/25 0836 100.9 kg (222 lb 7.1 oz)       BP Readings from Last 3 Encounters:   06/18/25 93/65   05/22/25 (!) 104/50   05/21/25 133/79       Patient Active Problem List    Diagnosis Date Noted    Tear of right rotator cuff 07/10/2025    History of colon polyps 05/22/2025    Type 2 diabetes mellitus without complication, without long-term current use of insulin 09/09/2020    Morbid obesity 09/09/2020    SELENA (iron deficiency anemia) 03/12/2018        Past Surgical History:   Procedure Laterality Date    COLONOSCOPY  05/22/2025    COLONOSCOPY N/A 5/22/2025    Procedure: COLONOSCOPY;  Surgeon: Toni Coleman MD;  Location: [facility];  Service: Endoscopy;  Laterality: N/A;    LEG SURGERY Right        Social History[1]      Chemistry        Component Value Date/Time     03/12/2025 1741     03/21/2021 0321    K 3.8 03/12/2025 1741    K 3.7 03/21/2021 0321     03/21/2021 0321    CO2 23 (L) 03/12/2025 1741    CO2 27 03/21/2021 0321    BUN 13.0 03/12/2025 1741    BUN 15 03/21/2021 0321    CREATININE 0.93 03/12/2025 1741    CREATININE 0.98 03/21/2021 0321     (H) 03/21/2021 0321        Component Value Date/Time    CALCIUM 9.9 03/12/2025 1741    CALCIUM 9.3 03/21/2021 0321    ALKPHOS 93 03/21/2021 0321    AST 13 03/12/2025 1741    AST 19 03/21/2021 0321    ALT 6 03/12/2025 1741    ALT 15 03/21/2021 0321    BILITOT 0.7 03/12/2025 1741    BILITOT 0.2 03/21/2021 0321    ESTGFRAFRICA >60.0 03/21/2021 0321     "EGFRNONAA >60.0 03/21/2021 0321            Lab Results   Component Value Date    WBC 6-10 (A) 04/23/2024    HGB 11.6 (L) 02/10/2023    HCT 35.6 02/10/2023     03/21/2021    CHOL 122 05/12/2023    TRIG 80 05/12/2023    HDL 55 05/12/2023    ALT 6 03/12/2025    AST 13 03/12/2025     03/12/2025    K 3.8 03/12/2025     03/21/2021    CREATININE 0.93 03/12/2025    BUN 13.0 03/12/2025    CO2 23 (L) 03/12/2025    TSH 1.09 04/01/2024    INR 1.0 10/05/2023    HGBA1C 5.9 (H) 04/01/2024       No results for input(s): "PT", "INR", "PROTIME", "APTT" in the last 72 hours.    No results found for this or any previous visit.         Pre-op Assessment    I have reviewed the Patient Summary Reports.     I have reviewed the Nursing Notes. I have reviewed the NPO Status.   I have reviewed the Medications.     Review of Systems  Anesthesia Hx:  No problems with previous Anesthesia   History of prior surgery of interest to airway management or planning:  Previous anesthesia: General, MAC 7/22/2024  Cystoscopy, Botox injection with general anesthesia.       5/22/2025  Colonoscopy with MAC.  Procedure performed at an [facility]. Airway issues documented on chart review include GETA, videolaryngoscope used  , view on video-laryngoscopy Grade I      Denies Personal Hx of Anesthesia complications.                    Social:  Former Smoker, Social Alcohol Use Former smoker of cigars, quit smoking in 1995,  Types: 2 Glasses of wine per week      Hematology/Oncology:    Oncology Normal    -- Anemia:               Hematology Comments: SELENA (iron deficiency anemia),  Sickle cell trait,  Anticoagulant long-time use- takes Aspirin and Plavix for CVA prevention                    EENT/Dental:   Allergies, LATEX ALLERGY,  Seasonal allergies,  H/O T&A,  Eye surgery          Cardiovascular:  Exercise tolerance: good   Hypertension, well controlled Valvular problems/Murmurs  Denies MI.     Denies CABG/stent.    Denies Angina. CHF    " hyperlipidemia  Denies LOPEZ.  ECG has been reviewed. Heart valve problem,  Cardiac valve surgery,  HFpEF, recommend risk factor modification and medication Patient on beta blockers       Congestive Heart Failure (CHF)                      Pulmonary:   COPD Asthma moderate  Denies Shortness of breath.  Sleep Apnea, CPAP moderate restriction of lung capacity due to obesity and likely anxiety.  Moderate persistent asthma, takes several controllers and rescue inhaler  Asthma:   Chronic Obstructive Pulmonary Disease (COPD):           Obstructive Sleep Apnea (GENEVA).           Renal/:  Chronic Renal Disease, CKD   Mixed stress and urge urinary incontinence,  Overactive detrusor,  H/O Pyelonephritis,  BUN = 13, Creat = 0.93, eGFR = 71 on 3/12/2025             Hepatic/GI:    Hiatal Hernia, GERD   H/O colon polyps,  Diverticulosis,  Has not started taking Mounjaro- will hold until after surgery   Taking GLP-1 Agonists Instructed to Hold for 7 Days           Musculoskeletal:     Tear of right rotator cuff,   H/O leg surgery for fracture,  Hammertoes of both feet        Spine Disorders: cervical Degenerative disease           Neurological:   CVA, residual symptoms    Seizures, well controlled    Cervical radiculopathy   Brachial neuritis or radiculitis nos,  Diabetic polyneuropathy,  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 Taking Topamax for seizures        Peripheral Neuropathy     Seizure Disorder             CVA - Cerebrovasular Accident                 Endocrine:  Diabetes, well controlled, type 2 Denies Hypothyroidism.  HA1C = 5.8 on 2/21/2025 Diabetes                    Morbid Obesity / BMI > 40  Psych:   anxiety               Physical Exam  General: Well nourished, Cooperative, Alert and Oriented    Airway:  Mallampati: III / II  Mouth Opening: Normal  TM Distance:  Normal  Tongue: Normal  Neck ROM: Normal ROM    Dental:  Periodontal disease      Past Medical History:   Diagnosis Date    Anticoagulant long-term use     Asthma     CHF (congestive heart failure)     COPD (chronic obstructive pulmonary disease)     CVA (cerebral vascular accident)     Diabetes mellitus     Seizure     Sleep apnea      Past Surgical History:   Procedure Laterality Date    COLONOSCOPY  05/22/2025    COLONOSCOPY N/A 5/22/2025    Procedure: COLONOSCOPY;  Surgeon: Toni Coleman MD;  Location: [facility];  Service: Endoscopy;  Laterality: N/A;    LEG SURGERY Right        Anesthesia Assessment: Preoperative EQUATION    Planned Procedure: Procedure(s) (LRB):  ARTHROSCOPY, SHOULDER, W/ ROTATOR CUFF REPAIR (Right)  ARTHROSCOPY, SHOULDER, W/ SUBACROMIAL DECOMPRESSION (Right)  ACROMIOPLASTY (Right)  Requested Anesthesia Type:General/Regional  Surgeon: Ramsey Montague MD  Service: Orthopedics  Known or anticipated Date of Surgery:7/11/2025    Surgeon notes: reviewed    Electronic QUestionnaire Assessment completed via nurse interview with patient.        Triage considerations:     The patient has no apparent active cardiac condition (No unstable coronary Syndrome such as severe unstable angina or recent [<1 month] myocardial infarction, decompensated CHF, severe valvular   disease or significant arrhythmia)    Previous anesthesia records:GETA, MAC, and No problems, video laryngoscopy, 7.0 ETT    Last PCP note: within 1 month , outside [facility]   Subspecialty notes: Cardiology: General    Other important co-morbidities: CHF, COPD, DM2, GERD, HLD, HTN, Morbid Obesity, and GENEVA       Outside EKG 7/2/2024:   Ref Range & Units 12 mo ago   VENTRICULAR RATE BPM 71   ATRIAL RATE BPM 71   P-R INTERVAL ms 160   QRS DURATION ms 86   Q-T INTERVAL ms 388   QTC CALCULATION(BEZET) ms 421   P AXIS degrees 37   R AXIS degrees -9   T AXIS degrees 39   INTERPRETATION (MUSE)  Normal sinus rhythm Possible Anterior infarct , age  undetermined  When compared with ECG of 05-OCT-2023 23:40, No significant change was found Confirmed by Morteza Whittington ([id number]) on 7/2/2024 5:20:35 PM          Echo 8/20/2024:  SUMMARY:    1. Normal left ventricular systolic function. LVEF of > 55%.    2. LV diastolic function is normal.    3. Normal right ventricular systolic function.    4. Insufficient TR jet to estimate PASP.    5. Normal central venous pressure.   Electronically signed by KEVIN JARQUIN MD on 8/20/2024 at 5:22:19 PM       Cardiac Cath 2/16/2023:  IMPRESSIONS:   · Normal resting right and left heart filling pressures and PA pressures   · No significant left to right shunt by screening oximetry   · Exercise induced rise in PCWP and PA pressures consistent with WHO2 PH       CTA head and Neck 12/21/2022:  IMPRESSION:   No evidence of significant stenosis, occlusion, aneurysm dilation, or dissection involving the arteries of the head or neck.   Electronically Signed By: Woody Hebert MD 12/21/2022 10:37 AM CST       Nuclear Stress Test 8/11/2022:  Impression  1. No myocardial perfusion defects identified.  2. Diffuse myocardial thickening with elevated rest and stress LV ejection fractions; findings would be compatible with HFpEF.  3. Normal blood pressure response to regadenoson.  4. Normal ECG response to regadenoson.  5. No prognostically significant arrhythmias were noted during the protocol.      PFT's 12/6/2023:  No evidence of obstruction on spirometry. Moderate complex restriction on lung volumes. Moderate reduction in diffusing capacity, corrected for hemoglobin. Low VA. Normal Kco. These findings are more consistent with interstitial lung disease.     PFT's 12/6/2023:  PRE- BD                                                                        Post-BD   DLCOunc (ml/min/mmHg) 11.29 17.54 3.53 -2.73 64   DLCOcor (ml/min/mmHg) 11.92 17.54 3.53 -2.41 67   DL/VA (ml/min/mmHg/L) 5.40 4.43 121   Hgb (gm/dL) 11.8 12-18   VA (L) 2.21 3.96 3.22  "-4.20 55   TLC (SB) (L) 2.36   Kco (ml/min/mmHg/L) 5.40 4.48 3.47 +1.35 120       Tests already available:  Results have been reviewed.             Instructions given. (See in Nurse's note) Preop medication instructions sent via portal message.     Optimization:  Anesthesia Preop Clinic Assessment Not Indicated    Medical Opinion Indicated: Yes, PCP?       Sub-specialist consult indicated:   Cardiology      Plan: Consultation:Patient's PCP for a statement of optimization     Patient  has previously scheduled Medical Appointment:    Navigation: Patient is cleared/ optimized for surgery by PCP and Cardiology.     Perioperative Cardiovascular Risk Assessment for Noncardiac Procedure/Surgery Per Cardiology note:  - Planned/Proposed Procedure/Surgery: rotator cuff repair  - Timing of Procedure/Surgery: Elective  - Risk of Procedure/Surgery: Intermediate  - METS: <4  - Active Cardiac Conditions: No current or active  - RCRI Score: 1  - RCRI Risk Factors: History of CVA  - Other CV Risk Factors: Obesity and Physical Inactivity  - The patient is low risk (<1% risk of MACE) for a Low-Intermediate procedure  Noting negative stress test in 2022  - No further cardiac workup is indicated at this time     Per PCP, "she is low risk for surgery. She may hold Plavix 5 days prior to surgery and it should be resumed within 48 hours."      Ht: 4'11  Wt: 100.9 kg (222 lb)  BMI: 44.93    Anesthesia Plan  Type of Anesthesia, risks & benefits discussed:    Anesthesia Type: Gen ETT, Regional  Intra-op Monitoring Plan: Standard ASA Monitors  Post Op Pain Control Plan: multimodal analgesia, IV/PO Opioids PRN and peripheral nerve block  Induction:  IV  Airway Plan: Video  Informed Consent: Informed consent signed with the Patient and all parties understand the risks and agree with anesthesia plan.  All questions answered.   ASA Score: 3  Day of Surgery Review of History & Physical: H&P Update referred to the surgeon/provider.  Anesthesia Plan " Notes: Patient and daughter at bedside both poor historians. Unsure as to what medications were taken today, which were held and if so, when held from. Patient states that she used 1 inhaler this morning but is unsure of which inhaler it was, stating some of her inhalers are lost. Daughter concerned about lower extremity swelling, which I do not appreciate on exam ( no pitting edema), but the patient has held her lasix for an unknown amount of time. Additionally, patient does not ambulate on own, utilizes motorized scooter even in house.   Decision made to not place interscalene catheter for patient so that recovery from PNB can be quicker and allow patient more use of her hand on the operative arm for assistance with ADLs. Interscalene single shot PNB will be done for pain control post operatively.         Ready For Surgery From Anesthesia Perspective.     .         [1]   Social History  Socioeconomic History    Marital status: Unknown   Tobacco Use    Smoking status: Never   Substance and Sexual Activity    Alcohol use: Yes     Comment: occ    Drug use: Never   Social History Narrative    ** Merged History Encounter **          Social Drivers of Health     Financial Resource Strain: High Risk (4/22/2024)    Received from [facility]    Overall Financial Resource Strain (CARDIA)     Difficulty of Paying Living Expenses: Very hard   Food Insecurity: Food Insecurity Present (4/22/2024)    Received from [facility]    Hunger Vital Sign     Worried About Running Out of Food in the Last Year: Often true     Ran Out of Food in the Last Year: Often true   Transportation Needs: Unmet Transportation Needs (4/22/2024)    Received from [facility]    PRAPARE - Transportation     Lack of Transportation (Medical): No     Lack of Transportation (Non-Medical): Yes   Physical Activity: Unknown (4/22/2024)    Received from [facility]    Exercise Vital Sign     Days of Exercise per Week: Patient declined     Minutes of Exercise per  Session: 0 min   Stress: Patient Declined (4/22/2024)    Received from [facility] - Occupational Stress Questionnaire     Feeling of Stress : Patient declined   Housing Stability: Patient Declined (4/22/2024)    Received from [facility]    Housing Stability Vital Sign     Unable to Pay for Housing in the Last Year: Patient declined     Unstable Housing in the Last Year: Patient declined

## 2025-07-11 NOTE — PROGRESS NOTES
Patient is AAO and VSS.  Tolerating PO and states pain is tolerable.  Dressing CDI.  Patient states they are ready for d/c.  IV removed.  Catheter tip intact.  Caregiver at bedside.  Discharge instructions reviewed and copy given to the patient and caregiver.  Questions answered.  Both verbalized understanding.  Medication delivered to bedside. sling with pillow in place. Patient has all needed equipment at home.  Patient wheeled to car by RN/PCT.

## 2025-07-13 NOTE — ANESTHESIA POSTPROCEDURE EVALUATION
Anesthesia Post Evaluation    Patient: Lucita Jackson    Procedure(s) Performed: Procedure(s) (LRB):  ARTHROSCOPY, SHOULDER, W/ SUBACROMIAL DECOMPRESSION (Right)  ACROMIOPLASTY (Right)    Final Anesthesia Type: general      Patient location during evaluation: PACU  Patient participation: Yes- Able to Participate  Level of consciousness: awake and alert and oriented  Post-procedure vital signs: reviewed and stable  Pain management: adequate  Airway patency: patent    PONV status at discharge: No PONV  Anesthetic complications: no      Cardiovascular status: blood pressure returned to baseline  Respiratory status: unassisted, spontaneous ventilation and room air  Hydration status: euvolemic  Follow-up not needed.              Vitals Value Taken Time   /70 07/11/25 12:45   Temp 36.6 °C (97.9 °F) 07/11/25 12:45   Pulse 85 07/11/25 13:00   Resp 35 07/11/25 12:53   SpO2 97 % 07/11/25 12:53   Vitals shown include unfiled device data.      Event Time   Out of Recovery 11:30:00         Pain/Junaid Score: Pain Rating Prior to Med Admin: 0 (7/11/2025 10:33 AM)  Pain Rating Post Med Admin: 3 (7/11/2025  1:00 PM)  Junaid Score: 10 (7/11/2025  1:00 PM)

## 2025-07-15 ENCOUNTER — TELEPHONE (OUTPATIENT)
Dept: ORTHOPEDICS | Facility: CLINIC | Age: 60
End: 2025-07-15
Payer: MEDICAID

## 2025-07-15 NOTE — TELEPHONE ENCOUNTER
----- Message from Yohana sent at 7/15/2025 10:26 AM CDT -----  Regarding: procedure clarifcation  I hope this message finds you well. Upon reviewing the operative report for patient above dated 07/11/25, I noticed that the procedure section mentions a rotator cuff repair; however, the detailed description indicates that only a partial thickness tear was debrided and no formal repair was performed.  To ensure accurate coding and documentation, could you please clarify whether a rotator cuff repair was completed during the procedure, or if it was limited to debridement? Per the documentation as it is now,  I have cpt codes 56571 and 57977.  Thank you very much for your time and assistance.      DIOR Osorio

## 2025-07-16 ENCOUNTER — CLINICAL SUPPORT (OUTPATIENT)
Dept: REHABILITATION | Facility: HOSPITAL | Age: 60
End: 2025-07-16
Attending: SURGERY
Payer: MEDICAID

## 2025-07-16 DIAGNOSIS — M25.511 ACUTE PAIN OF RIGHT SHOULDER: Primary | ICD-10-CM

## 2025-07-16 DIAGNOSIS — M25.611 DECREASED ROM OF RIGHT SHOULDER: ICD-10-CM

## 2025-07-16 PROCEDURE — 97161 PT EVAL LOW COMPLEX 20 MIN: CPT

## 2025-07-16 NOTE — PROGRESS NOTES
Outpatient Rehab    Physical Therapy Evaluation    Patient Name: IRASEMA Jackson  MRN: [id number]  YOB: 1965  Encounter Date: 7/16/2025    Therapy Diagnosis:   Encounter Diagnoses   Name Primary?    Acute pain of right shoulder Yes    Decreased ROM of right shoulder      Physician: Ramsey Montague MD    Physician Orders: Eval and Treat  Medical Diagnosis: Tear of right rotator cuff  Surgical Diagnosis: Not applicable for this Episode   Surgical Date: Not applicable for this Episode  Days Since Last Surgery: Not applicable for this Episode    Visit # / Visits Authorized:  1 / 1  Insurance Authorization Period: 7/11/2025 to 7/11/2026  Date of Evaluation: 7/16/2025  Plan of Care Certification: 7/16/2025 to 10/8/2025     Time In: 1530   Time Out: 1640  Total Time (in minutes): 70   Total Billable Time (in minutes): 70    Intake Outcome Measure for FOTO Survey    Therapist reviewed FOTO scores for IRASEMA Jackson on 7/16/2025.   FOTO report - see Media section or FOTO account episode details.     Intake Score: 4%    Precautions:       Subjective   History of Present Illness  IRASEMA is a 60 y.o. female who reports to physical therapy with a chief concern of R shoulder pain s/p R RTC repair, biceps tenodesis.                 Dominant Hand: Right  History of Present Condition/Illness: Pt states prior to surgery she had a lot of shoulder pain and had difficulty reaching forward, behind her head, and behind her back. This lead to the eventual procedure she had done (7/11/2025).   Pt states she uses the powered wheelchair for community ambulation but is independent with transfers.   Pain     Patient reports a current pain level of 7/10. Pain at best is reported as 2/10. Pain at worst is reported as 10/10.   Location: R shoulder pain  Clinical Progression (since onset): Stable  Pain Qualities: Aching, Discomfort, Sharp  Pain-Relieving Factors: Ice, Medications - prescription, Medications -  over-the-counter, Rest  Pain-Aggravating Factors: Other (Comment)  Other Pain-Aggravating Factors: Accidental movement of the shoulder.         Living Arrangements  Living Situation  Living Arrangements: Family members    Flight of stairs to get to the bathroom to shower.       Employment  Employment Status: Not employed   Pt states she is on disability      Past Medical History/Physical Systems Review:   Lucita Jackson  has a past medical history of Anticoagulant long-term use, Asthma, CHF (congestive heart failure), COPD (chronic obstructive pulmonary disease), CVA (cerebral vascular accident), Diabetes mellitus, Seizure, and Sleep apnea.    Lcuita Jackson  has a past surgical history that includes Leg Surgery (Right); Colonoscopy (05/22/2025); Colonoscopy (N/A, 5/22/2025); Arthroscopy of shoulder with decompression of subacromial space (Right, 7/11/2025); and Acromioplasty (Right, 7/11/2025).    Lucita has a current medication list which includes the following prescription(s): acetaminophen, albuterol, aspirin, azelastine, beclomethasone, benralizumab, blood sugar diagnostic, cetirizine, clonazepam, farxiga, docusate sodium, doxepin, ergocalciferol, ferrous sulfate, fluticasone propionate, gabapentin, hydroxyzine, ibuprofen, jardiance, lancets, methylphenidate hcl, myrbetriq, mirtazapine, montelukast, mounjaro, nitroglycerin, omeprazole, ondansetron, oxybutynin, oxycodone, potassium chloride sa, rosuvastatin, spiriva with handihaler, symbicort, topiramate, torsemide, trazodone, umeclidinium-vilanterol, and vitamin d.    Review of patient's allergies indicates:   Allergen Reactions    Sulfa (sulfonamide antibiotics) Hives and Other (See Comments)    Tomato Other (See Comments)    Latex Rash and Other (See Comments)    Tomato (solanum lycopersicum) Hives and Rash        Objective        RANGE OF MOTION:  *denotes pain     Shoulder ROM measures limited due to post operative restrictions      Shoulder ROM  (Degrees) Right Left Goal   Shoulder Flexion 90 160 160   Shoulder Abduction  90 160 160   Shoulder ER  30* 80 80   Shoulder IR  45* 65 65     Elbow ROM  (Degrees) Right Left Goal   Elbow Flexion  146 165 165   Elbow Extension 60* 0 0       STRENGTH:  *denotes pain     MMT measures limited due to post operative status     MMT Right Left Goal   Shoulder Flexion NT/5 4-/5 5/5   Shoulder Abduction NT/5 4-/5 5/5   Shoulder IR NT/5 4-/5 5/5   Shoulder ER NT/5 4-/5 5/5   Elbow Flexion  NT/5 4/5 5/5   Elbow Extension NT/5 4/5 5/5   Wrist Flexion 4/5 4+/5 5/5    Wrist Extension 4/5 4+/5 5/5       Initial Observation: Pt presents in power wheelchair with shoulder brace donned.     Sensation:  Sensation is intact to light touch in B upper extremities      Treatment:  Balance/Neuromuscular Re-Education  NMR 1: wrist extension 2x10  NMR 2: ball squeeze 20x  NMR 3: shoulder shrugs 2x10  NMR 4: scapular retractions 2x10    Time Entry(in minutes):  PT Evaluation (Low) Time Entry: 45  Neuromuscular Re-Education Time Entry: 15    Assessment & Plan   Assessment  IRASEMA presents with a condition of Low complexity.   Presentation of Symptoms: Stable       Functional Limitations: Reaching, Range of motion, Pain when reaching, Pain with ADLs/IADLs, Participating in leisure activities, Performing household chores, Functional mobility, Activity tolerance, Carrying objects, Completing self-care activities  Impairments: Abnormal or restricted range of motion, Impaired physical strength, Pain with functional activity  Personal Factors Affecting Prognosis: Pain    Patient Goal for Therapy (PT): Improve ability to use R shoulder  Prognosis: Fair  Prognosis Details: Prognosis for PT fair due to increased patient deconditioning prior to surgery.   Assessment Details: Pt is a 60 year old female presenting to the clinic s/p R RTC repair with biceps tenodesis (7/11/2025).. Pt presents with limitations including decreased shoulder  ROM, decreased shoulder strength, Decreased elbow ROM, and increased shoulder pain. These impairments are limiting the patients ability to perform functional activities and participate in low, moderate, and high level ADL's pain free. The patient was educated on the role of PT, pathoanatomical processes experienced, their POC, & prescribed HEP. They were agreeable/consented to treatment & have no precautions or contraindications to PT interventions identified. They will benefit from skilled outpatient PT intervention to address the functional limitations identified for returning to their prior level of function & improving her quality of life.     Plan  From a physical therapy perspective, the patient would benefit from: Skilled Rehab Services    Planned therapy interventions include: Therapeutic exercise, Therapeutic activities, Neuromuscular re-education, and Manual therapy.            Visit Frequency: 2 times Per Week for 12 Weeks.       This plan was discussed with Patient.   Discussion participants: Agreed Upon Plan of Care             The patient's spiritual, cultural, and educational needs were considered, and the patient is agreeable to the plan of care and goals.           Goals:   Active       1. STG       Pain       Start:  07/16/25    Expected End:  10/08/25       Patient will report decreased pain to <6/10 at worst on VAS to progress toward Prior Level of Function.         Function       Start:  07/16/25    Expected End:  10/08/25       Patient will report improved function by score of >20 out of 100 on FOTO.         ROM       Start:  07/16/25    Expected End:  10/08/25       Patient will improve AROM to 50% of stated goals in order to progress towards Prior Level of Function.         Strength       Start:  07/16/25    Expected End:  10/08/25       Patient will improve strength to 50% of stated goals in order to progress towards Prior Level of Function.            2. LTG       Pain       Start:   07/16/25    Expected End:  10/08/25       Patient will report decreased pain to <2/10 at worst on VAS to progress toward Prior Level of Function.          Function       Start:  07/16/25    Expected End:  10/08/25       Patient will report improved function by score of >60 out of 100 on FOTO.         ROM       Start:  07/16/25    Expected End:  10/08/25       Patient will improve ROM and Functional Mobility to stated goals to return to Prior Level of Function.         Strength       Start:  07/16/25    Expected End:  10/08/25       Patient will improve strength to stated goals in order to return to Prior Level of Function.             Gonzalez Person, PT, DPT

## 2025-07-17 PROBLEM — M25.611 DECREASED ROM OF RIGHT SHOULDER: Status: ACTIVE | Noted: 2025-07-17

## 2025-07-28 ENCOUNTER — OFFICE VISIT (OUTPATIENT)
Dept: ORTHOPEDICS | Facility: CLINIC | Age: 60
End: 2025-07-28
Payer: MEDICAID

## 2025-07-28 VITALS — BODY MASS INDEX: 44.76 KG/M2 | WEIGHT: 222 LBS | HEIGHT: 59 IN

## 2025-07-28 DIAGNOSIS — M75.101 NONTRAUMATIC TEAR OF RIGHT ROTATOR CUFF, UNSPECIFIED TEAR EXTENT: Primary | ICD-10-CM

## 2025-07-28 PROBLEM — M19.011 GLENOHUMERAL ARTHRITIS, RIGHT: Status: ACTIVE | Noted: 2025-07-28

## 2025-07-28 PROCEDURE — 99214 OFFICE O/P EST MOD 30 MIN: CPT | Mod: PBBFAC,PN | Performed by: SURGERY

## 2025-07-28 PROCEDURE — 1159F MED LIST DOCD IN RCRD: CPT | Mod: CPTII,,, | Performed by: SURGERY

## 2025-07-28 PROCEDURE — 99999 PR PBB SHADOW E&M-EST. PATIENT-LVL IV: CPT | Mod: PBBFAC,,, | Performed by: SURGERY

## 2025-07-28 PROCEDURE — 99024 POSTOP FOLLOW-UP VISIT: CPT | Mod: ,,, | Performed by: SURGERY

## 2025-07-28 NOTE — PROGRESS NOTES
SUBJECTIVE:    Lucita Jackson is here today for a follow up visit.  Status post  1.  Arthroscopic rotator cuff debridement.    2.  Biceps tenodesis      Date of surgery 07/11/2025  Days since surgery 17  Doing well.  Patient has been compliant with all postoperative protocol.  Patient has reported 1 PT session since surgery.  Per patient report: no fevers, chills, shortness of breath, chest pain, or increased extremity pain.  Presents for further follow up.      OBJECTIVE:    There were no vitals filed for this visit.      Extremity Exam  Incisions clean dry and intact, no erythema, drainage, exudate, minimal ecchymosis.  Sutures in place.  Full elbow and wrist range of motion..  Sensation intact to light touch throughout foot.  Neurovascularly intact.     DIAGNOSTIC STUDIES:  No new imaging today    ASSESSMENT:   1. Status post procedure above      PLAN:     Continue ASA for DVT PPX.   Sutures removed, can discontinue sling and progress with physical therapy as able  Continue physical therapy  Follow up in 4 weeks  with physician's associate  Discussed precautions for Steri-Strips.  Remove strips if they become dirty, can cover with small Band-Aid.  Report any yellow, green, or white drainage to our office.  Report to the emergency room for increased swelling, fevers, chills, or exudative discharge to the area.     Ramsey Montague MD  [facility]  Orthopedic Surgery      This note was done with voice recognition software. Please excuse any errors missed in proof reading.

## 2025-07-29 ENCOUNTER — CLINICAL SUPPORT (OUTPATIENT)
Dept: REHABILITATION | Facility: HOSPITAL | Age: 60
End: 2025-07-29
Payer: MEDICAID

## 2025-07-29 DIAGNOSIS — M25.611 DECREASED ROM OF RIGHT SHOULDER: ICD-10-CM

## 2025-07-29 DIAGNOSIS — M25.511 ACUTE PAIN OF RIGHT SHOULDER: Primary | ICD-10-CM

## 2025-07-29 PROCEDURE — 97110 THERAPEUTIC EXERCISES: CPT | Mod: CQ

## 2025-07-29 NOTE — PROGRESS NOTES
Outpatient Rehab    Physical Therapy Visit    Patient Name: IRASMEA Jackson  MRN: [id number]  YOB: 1965  Encounter Date: 7/29/2025    Therapy Diagnosis:   Encounter Diagnoses   Name Primary?    Acute pain of right shoulder Yes    Decreased ROM of right shoulder      Physician: Ramsey Montague MD    Physician Orders: Eval and Treat  Medical Diagnosis: Unspecified rotator cuff tear or rupture of right shoulder, not specified as traumatic  Surgical Diagnosis: Not applicable for this Episode   Surgical Date: Not applicable for this Episode  Days Since Last Surgery: Not applicable for this Episode    Visit # / Visits Authorized:  1 / 20  Insurance Authorization Period: 7/15/2025 to 12/31/2025  Date of Evaluation: 7/16/2025  Plan of Care Certification: 7/17/2025 to 10/8/2025      PT/PTA: PTA   Number of PTA visits since last PT visit:1  Time In: 1405   Time Out: 1500  Total Time (in minutes): 55   Total Billable Time (in minutes): 25    FOTO:  Intake Score (%): 4  Survey Score 2 (%): Not applicable for this Episode  Survey Score 3 (%): Not applicable for this Episode    Precautions:         Subjective   R shoulder has been uncomfortable.  Pain reported as 8/10.      Objective            Treatment:  Balance/Neuromuscular Re-Education  NMR 1: wrist extension 2x10  NMR 2: ball squeeze 20x  NMR 3: shoulder shrugs 2x10  NMR 4: standing pendulums: 4 min; scapular retractions 2x10  NMR 5: PROM shoulder flexion, abd, ER & elbow flexion    Time Entry(in minutes):  Therapeutic Exercise Time Entry: 60    Assessment & Plan   Assessment: Patient presented in electric scooter donning on a sling on right shoulder. Treatment focused on wrist/elbow/shoulder PROM and periscapular activation. Followed bicep tenodesis protocol. Patient achieved the following PROM: ~90* elbow flexion, ~90* shoulder flexion, ~80* shoulder abd, ~10* shoulder ER. Will progress per protocol.        The patient will continue to benefit from  skilled outpatient physical therapy in order to address the deficits listed in the problem list on the initial evaluation, provide patient and family education, and maximize the patients level of independence in the home and community environments.     The patient's spiritual, cultural, and educational needs were considered, and the patient is agreeable to the plan of care and goals.           Plan: Will continue per POC towards treatment goals. PT/PTA met face to face to discuss patient's treatment plan and progress towards established goals. Patient will be seen by physical therapist every sixth visit and minimally once per month.    Goals:   Active       1. STG       Pain       Start:  07/16/25    Expected End:  10/08/25       Patient will report decreased pain to <6/10 at worst on VAS to progress toward Prior Level of Function.         Function       Start:  07/16/25    Expected End:  10/08/25       Patient will report improved function by score of >20 out of 100 on FOTO.         ROM       Start:  07/16/25    Expected End:  10/08/25       Patient will improve AROM to 50% of stated goals in order to progress towards Prior Level of Function.         Strength       Start:  07/16/25    Expected End:  10/08/25       Patient will improve strength to 50% of stated goals in order to progress towards Prior Level of Function.            2. LTG       Pain       Start:  07/16/25    Expected End:  10/08/25       Patient will report decreased pain to <2/10 at worst on VAS to progress toward Prior Level of Function.          Function       Start:  07/16/25    Expected End:  10/08/25       Patient will report improved function by score of >60 out of 100 on FOTO.         ROM       Start:  07/16/25    Expected End:  10/08/25       Patient will improve ROM and Functional Mobility to stated goals to return to Prior Level of Function.         Strength       Start:  07/16/25    Expected End:  10/08/25       Patient will improve  strength to stated goals in order to return to Prior Level of Function.             Tom Bowman, PTA

## 2025-07-31 ENCOUNTER — CLINICAL SUPPORT (OUTPATIENT)
Dept: REHABILITATION | Facility: HOSPITAL | Age: 60
End: 2025-07-31
Payer: MEDICAID

## 2025-07-31 DIAGNOSIS — S46.011A TRAUMATIC TEAR OF RIGHT ROTATOR CUFF, UNSPECIFIED TEAR EXTENT, INITIAL ENCOUNTER: ICD-10-CM

## 2025-07-31 DIAGNOSIS — M25.511 ACUTE PAIN OF RIGHT SHOULDER: Primary | ICD-10-CM

## 2025-07-31 DIAGNOSIS — M25.611 DECREASED ROM OF RIGHT SHOULDER: ICD-10-CM

## 2025-07-31 PROCEDURE — 97110 THERAPEUTIC EXERCISES: CPT | Mod: CQ

## 2025-07-31 NOTE — PROGRESS NOTES
Outpatient Rehab    Physical Therapy Visit    Patient Name: IARSEMA Jackson  MRN: [id number]  YOB: 1965  Encounter Date: 7/31/2025    Therapy Diagnosis:   Encounter Diagnoses   Name Primary?    Acute pain of right shoulder Yes    Decreased ROM of right shoulder      Physician: Ramsey Montague MD    Physician Orders: Eval and Treat  Medical Diagnosis: Unspecified rotator cuff tear or rupture of right shoulder, not specified as traumatic  Surgical Diagnosis: Not applicable for this Episode   Surgical Date: Not applicable for this Episode  Days Since Last Surgery: Not applicable for this Episode    Visit # / Visits Authorized:  2 / 20  Insurance Authorization Period: 7/15/2025 to 12/31/2025  Date of Evaluation: 7/16/2025  Plan of Care Certification: 7/17/2025 to 10/8/2025      PT/PTA: PTA   Number of PTA visits since last PT visit:2  Time In: 1010   Time Out: 1105  Total Time (in minutes): 55   Total Billable Time (in minutes): 27    FOTO:  Intake Score (%): 4  Survey Score 2 (%): Not applicable for this Episode  Survey Score 3 (%): Not applicable for this Episode    Precautions:         Subjective   reports that she was in pain post last treatment session; took 4 Tylenols and 2 muscle relaxors prior to comming to today's visit.  Pain reported as 5/10.      Objective            Treatment:  Therapeutic Exercise  TE 1: wrist extension 2x10  TE 2: ball squeeze 20x  TE 3: shoulder shrugs 2x10  TE 4: standing pendulums: side to side 2, cw/ccw cirlcles 2 min per direction; scapular retractions 2x10  TE 5: shoulder isometrics: abd/add/ext/IR/ER: 1x15 with 5 sec hold/direction  TE 6: PROM shoulder flexion, abd, ER & elbow flexion  Balance/Neuromuscular Re-Education  NMR 1: -  NMR 2: -  NMR 3: -  NMR 4: -  NMR 5: -    Time Entry(in minutes):  Therapeutic Exercise Time Entry: 55    Assessment & Plan   Assessment: Patient presented in electric scooter donning on a sling on right shoulder. Treatment focused on  wrist/elbow/shoulder PROM and periscapular activation. Shoulder isometrics were initiated during today's treatment session into abd, add & ext. Followed bicep tenodesis protocol. Patient achieved the following PROM: ~125* elbow flexion, ~105* shoulder flexion, ~90* shoulder abd, ~25* shoulder ER. Will progress per protocol.        The patient will continue to benefit from skilled outpatient physical therapy in order to address the deficits listed in the problem list on the initial evaluation, provide patient and family education, and maximize the patients level of independence in the home and community environments.     The patient's spiritual, cultural, and educational needs were considered, and the patient is agreeable to the plan of care and goals.           Plan: Will continue per POC towards treatment goals. PT/PTA met face to face to discuss patient's treatment plan and progress towards established goals. Patient will be seen by physical therapist every sixth visit and minimally once per month.    Goals:   Active       1. STG       Pain       Start:  07/16/25    Expected End:  10/08/25       Patient will report decreased pain to <6/10 at worst on VAS to progress toward Prior Level of Function.         Function       Start:  07/16/25    Expected End:  10/08/25       Patient will report improved function by score of >20 out of 100 on FOTO.         ROM       Start:  07/16/25    Expected End:  10/08/25       Patient will improve AROM to 50% of stated goals in order to progress towards Prior Level of Function.         Strength       Start:  07/16/25    Expected End:  10/08/25       Patient will improve strength to 50% of stated goals in order to progress towards Prior Level of Function.            2. LTG       Pain       Start:  07/16/25    Expected End:  10/08/25       Patient will report decreased pain to <2/10 at worst on VAS to progress toward Prior Level of Function.          Function       Start:  07/16/25     Expected End:  10/08/25       Patient will report improved function by score of >60 out of 100 on FOTO.         ROM       Start:  07/16/25    Expected End:  10/08/25       Patient will improve ROM and Functional Mobility to stated goals to return to Prior Level of Function.         Strength       Start:  07/16/25    Expected End:  10/08/25       Patient will improve strength to stated goals in order to return to Prior Level of Function.             Tom Bowman, PTA

## 2025-08-01 RX ORDER — DEXTROMETHORPHAN HYDROBROMIDE, GUAIFENESIN 5; 100 MG/5ML; MG/5ML
650 LIQUID ORAL EVERY 8 HOURS
Qty: 42 TABLET | Refills: 0 | Status: SHIPPED | OUTPATIENT
Start: 2025-08-01

## 2025-08-01 RX ORDER — OXYCODONE HYDROCHLORIDE 5 MG/1
5 TABLET ORAL EVERY 4 HOURS PRN
Qty: 20 TABLET | Refills: 0 | Status: SHIPPED | OUTPATIENT
Start: 2025-08-01

## 2025-08-01 RX ORDER — IBUPROFEN 600 MG/1
600 TABLET, FILM COATED ORAL 3 TIMES DAILY
Qty: 42 TABLET | Refills: 0 | Status: SHIPPED | OUTPATIENT
Start: 2025-08-01

## 2025-08-04 ENCOUNTER — CLINICAL SUPPORT (OUTPATIENT)
Dept: REHABILITATION | Facility: HOSPITAL | Age: 60
End: 2025-08-04
Payer: MEDICAID

## 2025-08-04 DIAGNOSIS — M25.611 DECREASED ROM OF RIGHT SHOULDER: ICD-10-CM

## 2025-08-04 DIAGNOSIS — M25.511 ACUTE PAIN OF RIGHT SHOULDER: Primary | ICD-10-CM

## 2025-08-04 PROCEDURE — 97110 THERAPEUTIC EXERCISES: CPT | Mod: CQ

## 2025-08-04 NOTE — PROGRESS NOTES
Outpatient Rehab    Physical Therapy Visit    Patient Name: IRASEMA Jackson  MRN: [id number]  YOB: 1965  Encounter Date: 8/4/2025    Therapy Diagnosis:   Encounter Diagnoses   Name Primary?    Acute pain of right shoulder Yes    Decreased ROM of right shoulder      Physician: Ramsey Montague MD    Physician Orders: Eval and Treat  Medical Diagnosis: Unspecified rotator cuff tear or rupture of right shoulder, not specified as traumatic  Surgical Diagnosis: Not applicable for this Episode   Surgical Date: Not applicable for this Episode  Days Since Last Surgery: Not applicable for this Episode    Visit # / Visits Authorized:  3 / 20  Insurance Authorization Period: 7/15/2025 to 12/31/2025  Date of Evaluation: 7/16/2025  Plan of Care Certification: 7/17/2025 to 10/8/2025      PT/PTA: PTA   Number of PTA visits since last PT visit:3  Time In: 1115   Time Out: 1210  Total Time (in minutes): 55   Total Billable Time (in minutes): 27    FOTO:  Intake Score (%): 4  Survey Score 2 (%): Not applicable for this Episode  Survey Score 3 (%): Not applicable for this Episode    Precautions:         Subjective   reports mod shoulder pain upon arrival.  Pain reported as 5/10.      Objective            Treatment:  Therapeutic Exercise  TE 1: wrist extension 2x10  TE 2: ball squeeze 20x  TE 3: shoulder shrugs 2x10  TE 4: standing pendulums: side to side 2, cw/ccw cirlcles 2 min per direction; scapular retractions 2x10  TE 5: shoulder isometrics: abd/add/ext: 1x15 with 5 sec hold/direction  TE 6: PROM shoulder flexion, abd, ER & elbow flexion      Time Entry(in minutes):  Therapeutic Exercise Time Entry: 55    Assessment & Plan   Assessment: Patient presented in electric scooter donning on a sling on right shoulder. Treatment focused on wrist/elbow/shoulder PROM and periscapular activation. Shoulder isometrics were continued during today's treatment session into abd, add & ext. Followed bicep tenodesis protocol.  Patient achieved the following PROM: ~125* elbow flexion, ~105* shoulder flexion, ~90* shoulder abd, ~25* shoulder ER. Will progress per protocol.        The patient will continue to benefit from skilled outpatient physical therapy in order to address the deficits listed in the problem list on the initial evaluation, provide patient and family education, and maximize the patients level of independence in the home and community environments.     The patient's spiritual, cultural, and educational needs were considered, and the patient is agreeable to the plan of care and goals.           Plan: Will continue per POC towards treatment goals. PT/PTA met face to face to discuss patient's treatment plan and progress towards established goals. Patient will be seen by physical therapist every sixth visit and minimally once per month.    Goals:   Active       1. STG       Pain       Start:  07/16/25    Expected End:  10/08/25       Patient will report decreased pain to <6/10 at worst on VAS to progress toward Prior Level of Function.         Function       Start:  07/16/25    Expected End:  10/08/25       Patient will report improved function by score of >20 out of 100 on FOTO.         ROM       Start:  07/16/25    Expected End:  10/08/25       Patient will improve AROM to 50% of stated goals in order to progress towards Prior Level of Function.         Strength       Start:  07/16/25    Expected End:  10/08/25       Patient will improve strength to 50% of stated goals in order to progress towards Prior Level of Function.            2. LTG       Pain       Start:  07/16/25    Expected End:  10/08/25       Patient will report decreased pain to <2/10 at worst on VAS to progress toward Prior Level of Function.          Function       Start:  07/16/25    Expected End:  10/08/25       Patient will report improved function by score of >60 out of 100 on FOTO.         ROM       Start:  07/16/25    Expected End:  10/08/25        Patient will improve ROM and Functional Mobility to stated goals to return to Prior Level of Function.         Strength       Start:  07/16/25    Expected End:  10/08/25       Patient will improve strength to stated goals in order to return to Prior Level of Function.             Tom Bowman, PTA

## 2025-08-07 ENCOUNTER — CLINICAL SUPPORT (OUTPATIENT)
Dept: REHABILITATION | Facility: HOSPITAL | Age: 60
End: 2025-08-07
Payer: MEDICAID

## 2025-08-07 DIAGNOSIS — M25.511 ACUTE PAIN OF RIGHT SHOULDER: Primary | ICD-10-CM

## 2025-08-07 DIAGNOSIS — M25.611 DECREASED ROM OF RIGHT SHOULDER: ICD-10-CM

## 2025-08-07 PROCEDURE — 97110 THERAPEUTIC EXERCISES: CPT | Mod: CQ

## 2025-08-07 NOTE — PROGRESS NOTES
Outpatient Rehab    Physical Therapy Visit    Patient Name: IRASEMA Jackson  MRN: [id number]  YOB: 1965  Encounter Date: 8/7/2025    Therapy Diagnosis:   Encounter Diagnoses   Name Primary?    Acute pain of right shoulder Yes    Decreased ROM of right shoulder      Physician: Ramsey Montague MD    Physician Orders: Eval and Treat  Medical Diagnosis: Unspecified rotator cuff tear or rupture of right shoulder, not specified as traumatic  Surgical Diagnosis: Not applicable for this Episode   Surgical Date: Not applicable for this Episode  Days Since Last Surgery: Not applicable for this Episode    Visit # / Visits Authorized:  4 / 20  Insurance Authorization Period: 7/15/2025 to 12/31/2025  Date of Evaluation: 7/16/2025  Plan of Care Certification: 7/17/2025 to 10/8/2025      PT/PTA: PTA   Number of PTA visits since last PT visit:4  Time In: 1110   Time Out: 1205  Total Time (in minutes): 55   Total Billable Time (in minutes): 27    FOTO:  Intake Score (%): 4  Survey Score 2 (%): Not applicable for this Episode  Survey Score 3 (%): Not applicable for this Episode    Precautions:         Subjective   soreness post last treatment visit.  Pain reported as 7/10.      Objective            Treatment:  Therapeutic Exercise  TE 1: wrist extension 2x10  TE 2: ball squeeze 20x  TE 3: shoulder shrugs 2x10  TE 4: standing pendulums: side to side 2, cw/ccw cirlcles 2 min per direction; scapular retractions 2x10  TE 5: shoulder isometrics: abd/add/ext: 2x10 with 5 sec hold/direction  TE 6: PROM shoulder flexion, abd, ER & elbow flexion  TE 7: shoulder IR/ER, yellow tb: 1x8 each; rows, yellow tb: 2x8      Time Entry(in minutes):  Therapeutic Exercise Time Entry: 55    Assessment & Plan   Assessment: Patient presented in electric scooter donning on a sling on right shoulder. Treatment focused on wrist/elbow/shoulder PROM and periscapular activation. Treatment was expanded per portocol including: resisted shoulder  IR/ER and rows. Shoulder isometrics were continued during today's treatment session into abd, add & ext. Followed bicep tenodesis protocol. Patient achieved the following PROM: ~125* elbow flexion, ~120* shoulder flexion, ~90* shoulder abd, ~25* shoulder ER. Will progress per protocol.        The patient will continue to benefit from skilled outpatient physical therapy in order to address the deficits listed in the problem list on the initial evaluation, provide patient and family education, and maximize the patients level of independence in the home and community environments.     The patient's spiritual, cultural, and educational needs were considered, and the patient is agreeable to the plan of care and goals.           Plan: Will continue per POC towards treatment goals. PT/PTA met face to face to discuss patient's treatment plan and progress towards established goals. Patient will be seen by physical therapist every sixth visit and minimally once per month.    Goals:   Active       1. STG       Pain       Start:  07/16/25    Expected End:  10/08/25       Patient will report decreased pain to <6/10 at worst on VAS to progress toward Prior Level of Function.         Function       Start:  07/16/25    Expected End:  10/08/25       Patient will report improved function by score of >20 out of 100 on FOTO.         ROM       Start:  07/16/25    Expected End:  10/08/25       Patient will improve AROM to 50% of stated goals in order to progress towards Prior Level of Function.         Strength       Start:  07/16/25    Expected End:  10/08/25       Patient will improve strength to 50% of stated goals in order to progress towards Prior Level of Function.            2. LTG       Pain       Start:  07/16/25    Expected End:  10/08/25       Patient will report decreased pain to <2/10 at worst on VAS to progress toward Prior Level of Function.          Function       Start:  07/16/25    Expected End:  10/08/25       Patient  will report improved function by score of >60 out of 100 on FOTO.         ROM       Start:  07/16/25    Expected End:  10/08/25       Patient will improve ROM and Functional Mobility to stated goals to return to Prior Level of Function.         Strength       Start:  07/16/25    Expected End:  10/08/25       Patient will improve strength to stated goals in order to return to Prior Level of Function.             Tom Bowman, PTA

## 2025-08-11 ENCOUNTER — CLINICAL SUPPORT (OUTPATIENT)
Dept: REHABILITATION | Facility: HOSPITAL | Age: 60
End: 2025-08-11
Payer: MEDICAID

## 2025-08-11 DIAGNOSIS — M25.611 DECREASED ROM OF RIGHT SHOULDER: ICD-10-CM

## 2025-08-11 DIAGNOSIS — M25.511 ACUTE PAIN OF RIGHT SHOULDER: Primary | ICD-10-CM

## 2025-08-11 PROCEDURE — 97110 THERAPEUTIC EXERCISES: CPT

## 2025-08-13 ENCOUNTER — HOSPITAL ENCOUNTER (EMERGENCY)
Facility: HOSPITAL | Age: 60
Discharge: HOME | End: 2025-08-13
Attending: EMERGENCY MEDICINE
Payer: COMMERCIAL

## 2025-08-13 VITALS
WEIGHT: 220 LBS | HEIGHT: 59 IN | BODY MASS INDEX: 44.35 KG/M2 | TEMPERATURE: 98 F | HEART RATE: 74 BPM | OXYGEN SATURATION: 96 % | DIASTOLIC BLOOD PRESSURE: 67 MMHG | SYSTOLIC BLOOD PRESSURE: 106 MMHG | RESPIRATION RATE: 17 BRPM

## 2025-08-13 DIAGNOSIS — F19.929 DRUG INTOXICATION WITH COMPLICATION: ICD-10-CM
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
## 2025-08-13 DIAGNOSIS — I95.9 HYPOTENSION, UNSPECIFIED HYPOTENSION TYPE: Primary | ICD-10-CM

## 2025-08-13 DIAGNOSIS — S83.92XA LEFT KNEE SPRAIN: ICD-10-CM

## 2025-08-13 DIAGNOSIS — I95.9 LOW BLOOD PRESSURE: ICD-10-CM

## 2025-08-13 LAB
ABSOLUTE EOSINOPHIL (OHS): 0 K/UL
ABSOLUTE MONOCYTE (OHS): 0.38 K/UL (ref 0.3–1)
ABSOLUTE NEUTROPHIL COUNT (OHS): 1.76 K/UL (ref 1.8–7.7)
ALBUMIN SERPL BCP-MCNC: 3.5 G/DL (ref 3.5–5.2)
ALP SERPL-CCNC: 84 UNIT/L (ref 40–150)
ALT SERPL W/O P-5'-P-CCNC: <8 UNIT/L (ref 10–44)
ANION GAP (OHS): 10 MMOL/L (ref 8–16)
AST SERPL-CCNC: 15 UNIT/L (ref 11–45)
BASOPHILS # BLD AUTO: 0.01 K/UL
BASOPHILS NFR BLD AUTO: 0.3 %
BILIRUB SERPL-MCNC: 0.3 MG/DL (ref 0.1–1)
BUN SERPL-MCNC: 19 MG/DL (ref 6–20)
CALCIUM SERPL-MCNC: 8.9 MG/DL (ref 8.7–10.5)
CHLORIDE SERPL-SCNC: 113 MMOL/L (ref 95–110)
CO2 SERPL-SCNC: 17 MMOL/L (ref 23–29)
CREAT SERPL-MCNC: 1 MG/DL (ref 0.5–1.4)
ERYTHROCYTE [DISTWIDTH] IN BLOOD BY AUTOMATED COUNT: 14.8 % (ref 11.5–14.5)
GFR SERPLBLD CREATININE-BSD FMLA CKD-EPI: >60 ML/MIN/1.73/M2
GLUCOSE SERPL-MCNC: 74 MG/DL (ref 70–110)
HCT VFR BLD AUTO: 34.1 % (ref 37–48.5)
HGB BLD-MCNC: 10.9 GM/DL (ref 12–16)
IMM GRANULOCYTES # BLD AUTO: 0 K/UL (ref 0–0.04)
IMM GRANULOCYTES NFR BLD AUTO: 0 % (ref 0–0.5)
LACTATE SERPL-SCNC: 1 MMOL/L (ref 0.5–2.2)
LYMPHOCYTES # BLD AUTO: 1.69 K/UL (ref 1–4.8)
MCH RBC QN AUTO: 27.5 PG (ref 27–31)
MCHC RBC AUTO-ENTMCNC: 32 G/DL (ref 32–36)
MCV RBC AUTO: 86 FL (ref 82–98)
NUCLEATED RBC (/100WBC) (OHS): 0 /100 WBC
PLATELET # BLD AUTO: 200 K/UL (ref 150–450)
PMV BLD AUTO: 10.3 FL (ref 9.2–12.9)
POCT GLUCOSE: 82 MG/DL (ref 70–110)
POTASSIUM SERPL-SCNC: 4 MMOL/L (ref 3.5–5.1)
PROCALCITONIN SERPL-MCNC: 0.02 NG/ML
PROT SERPL-MCNC: 6.2 GM/DL (ref 6–8.4)
RBC # BLD AUTO: 3.96 M/UL (ref 4–5.4)
RELATIVE EOSINOPHIL (OHS): 0 %
RELATIVE LYMPHOCYTE (OHS): 44 % (ref 18–48)
RELATIVE MONOCYTE (OHS): 9.9 % (ref 4–15)
RELATIVE NEUTROPHIL (OHS): 45.8 % (ref 38–73)
SODIUM SERPL-SCNC: 140 MMOL/L (ref 136–145)
WBC # BLD AUTO: 3.84 K/UL (ref 3.9–12.7)

## 2025-08-13 PROCEDURE — 85025 COMPLETE CBC W/AUTO DIFF WBC: CPT | Performed by: EMERGENCY MEDICINE

## 2025-08-13 PROCEDURE — 83605 ASSAY OF LACTIC ACID: CPT | Performed by: EMERGENCY MEDICINE

## 2025-08-13 PROCEDURE — 93010 ELECTROCARDIOGRAM REPORT: CPT | Mod: ,,, | Performed by: INTERNAL MEDICINE

## 2025-08-13 PROCEDURE — 96361 HYDRATE IV INFUSION ADD-ON: CPT

## 2025-08-13 PROCEDURE — 99285 EMERGENCY DEPT VISIT HI MDM: CPT | Mod: 25

## 2025-08-13 PROCEDURE — 82962 GLUCOSE BLOOD TEST: CPT

## 2025-08-13 PROCEDURE — 96360 HYDRATION IV INFUSION INIT: CPT

## 2025-08-13 PROCEDURE — 80053 COMPREHEN METABOLIC PANEL: CPT | Performed by: EMERGENCY MEDICINE

## 2025-08-13 PROCEDURE — 93005 ELECTROCARDIOGRAM TRACING: CPT

## 2025-08-13 PROCEDURE — 84145 PROCALCITONIN (PCT): CPT | Performed by: EMERGENCY MEDICINE

## 2025-08-13 PROCEDURE — 25000003 PHARM REV CODE 250: Performed by: EMERGENCY MEDICINE

## 2025-08-13 RX ORDER — ACETAMINOPHEN 500 MG
1000 TABLET ORAL EVERY 8 HOURS PRN
Qty: 40 TABLET | Refills: 0 | Status: SHIPPED | OUTPATIENT
Start: 2025-08-13

## 2025-08-13 RX ADMIN — SODIUM CHLORIDE 1000 ML: 9 INJECTION, SOLUTION INTRAVENOUS at 03:08

## 2025-08-13 RX ADMIN — SODIUM CHLORIDE 1000 ML: 9 INJECTION, SOLUTION INTRAVENOUS at 04:08

## 2025-08-13 RX ADMIN — SODIUM CHLORIDE 1000 ML: 9 INJECTION, SOLUTION INTRAVENOUS at 02:08

## 2025-08-14 ENCOUNTER — CLINICAL SUPPORT (OUTPATIENT)
Dept: REHABILITATION | Facility: HOSPITAL | Age: 60
End: 2025-08-14
Payer: MEDICAID

## 2025-08-14 DIAGNOSIS — M25.511 ACUTE PAIN OF RIGHT SHOULDER: Primary | ICD-10-CM

## 2025-08-14 DIAGNOSIS — M25.611 DECREASED ROM OF RIGHT SHOULDER: ICD-10-CM

## 2025-08-14 LAB
OHS QRS DURATION: 98 MS
OHS QTC CALCULATION: 460 MS

## 2025-08-14 PROCEDURE — 97110 THERAPEUTIC EXERCISES: CPT

## 2025-08-18 ENCOUNTER — CLINICAL SUPPORT (OUTPATIENT)
Dept: REHABILITATION | Facility: HOSPITAL | Age: 60
End: 2025-08-18
Payer: MEDICAID

## 2025-08-18 DIAGNOSIS — M25.611 DECREASED ROM OF RIGHT SHOULDER: ICD-10-CM

## 2025-08-18 DIAGNOSIS — M25.511 ACUTE PAIN OF RIGHT SHOULDER: Primary | ICD-10-CM

## 2025-08-18 PROCEDURE — 97110 THERAPEUTIC EXERCISES: CPT | Mod: CQ

## 2025-08-21 ENCOUNTER — CLINICAL SUPPORT (OUTPATIENT)
Dept: REHABILITATION | Facility: HOSPITAL | Age: 60
End: 2025-08-21
Payer: MEDICAID

## 2025-08-21 DIAGNOSIS — M25.511 ACUTE PAIN OF RIGHT SHOULDER: Primary | ICD-10-CM

## 2025-08-21 DIAGNOSIS — M25.611 DECREASED ROM OF RIGHT SHOULDER: ICD-10-CM

## 2025-08-21 PROCEDURE — 97110 THERAPEUTIC EXERCISES: CPT

## 2025-08-25 ENCOUNTER — CLINICAL SUPPORT (OUTPATIENT)
Dept: REHABILITATION | Facility: HOSPITAL | Age: 60
End: 2025-08-25
Payer: MEDICAID

## 2025-08-25 DIAGNOSIS — M25.511 ACUTE PAIN OF RIGHT SHOULDER: Primary | ICD-10-CM

## 2025-08-25 DIAGNOSIS — M25.611 DECREASED ROM OF RIGHT SHOULDER: ICD-10-CM

## 2025-08-25 PROCEDURE — 97110 THERAPEUTIC EXERCISES: CPT | Mod: CQ

## 2025-08-28 ENCOUNTER — CLINICAL SUPPORT (OUTPATIENT)
Dept: REHABILITATION | Facility: HOSPITAL | Age: 60
End: 2025-08-28
Payer: MEDICAID

## 2025-08-28 DIAGNOSIS — M25.611 DECREASED ROM OF RIGHT SHOULDER: ICD-10-CM

## 2025-08-28 DIAGNOSIS — M25.511 ACUTE PAIN OF RIGHT SHOULDER: Primary | ICD-10-CM

## 2025-08-28 PROCEDURE — 97110 THERAPEUTIC EXERCISES: CPT | Mod: CQ

## 2025-09-01 PROBLEM — M25.511 ACUTE PAIN OF RIGHT SHOULDER: Status: RESOLVED | Noted: 2025-07-16 | Resolved: 2025-09-01

## 2025-09-03 ENCOUNTER — TELEPHONE (OUTPATIENT)
Dept: SPORTS MEDICINE | Facility: CLINIC | Age: 60
End: 2025-09-03
Payer: MEDICAID

## (undated) DEVICE — SUT 3-0 ETHILON 18 FS-1

## (undated) DEVICE — SHAVER ULTRAFFR 4.2MM

## (undated) DEVICE — GLOVE SENSICARE PI SURG 7.5

## (undated) DEVICE — SOL IRR NACL .9% 3000ML

## (undated) DEVICE — COVER LIGHT HANDLE 80/CA

## (undated) DEVICE — GLOVE SENSICARE PI GRN 8.5

## (undated) DEVICE — Device

## (undated) DEVICE — PROBE ARTHSCP RF90 D 140MM

## (undated) DEVICE — DRESSING XEROFORM NONADH 1X8IN

## (undated) DEVICE — ELECTRODE REM PLYHSV RETURN 9

## (undated) DEVICE — GOWN ECLIPSE REINF L4 XLNG XXL

## (undated) DEVICE — GLOVE SENSICARE PI GRN 7.5

## (undated) DEVICE — TAPE SURG MEDIPORE 6X72IN

## (undated) DEVICE — BNDG COFLEX FOAM LF2 ST 4X5YD

## (undated) DEVICE — GLOVE SENSICARE PI SURG 8

## (undated) DEVICE — BLADE VORTEX SHAVER 4.5MMX13CM

## (undated) DEVICE — TUBING SUC UNIV W/CONN 12FT

## (undated) DEVICE — CONNECTOR TUBING STR 5 IN 1

## (undated) DEVICE — MANIFOLD 4 PORT

## (undated) DEVICE — GOWN POLY REINF X-LONG XL

## (undated) DEVICE — PAD PREP CUFFED NS 24X48IN

## (undated) DEVICE — WRAP SHLDR HIP ACCU THRM PACK

## (undated) DEVICE — SPONGE COTTON TRAY 4X4IN

## (undated) DEVICE — KIT TRACTION SHLDR ST DISP LF

## (undated) DEVICE — TOWEL OR DISP STRL BLUE 4/PK

## (undated) DEVICE — APPLICATOR CHLORAPREP ORN 26ML

## (undated) DEVICE — DRAPE THREE-QTR REINF 53X77IN

## (undated) DEVICE — PAD ABDOMINAL STERILE 8X10IN